# Patient Record
Sex: MALE | Race: WHITE | NOT HISPANIC OR LATINO | Employment: FULL TIME | URBAN - METROPOLITAN AREA
[De-identification: names, ages, dates, MRNs, and addresses within clinical notes are randomized per-mention and may not be internally consistent; named-entity substitution may affect disease eponyms.]

---

## 2017-12-05 ENCOUNTER — HOSPITAL ENCOUNTER (EMERGENCY)
Facility: CLINIC | Age: 27
Discharge: HOME | End: 2017-12-05
Attending: EMERGENCY MEDICINE | Admitting: EMERGENCY MEDICINE
Payer: COMMERCIAL

## 2017-12-05 VITALS
RESPIRATION RATE: 16 BRPM | SYSTOLIC BLOOD PRESSURE: 168 MMHG | DIASTOLIC BLOOD PRESSURE: 108 MMHG | TEMPERATURE: 98.2 F | OXYGEN SATURATION: 98 %

## 2017-12-05 DIAGNOSIS — M54.41 CHRONIC RIGHT-SIDED LOW BACK PAIN WITH RIGHT-SIDED SCIATICA: ICD-10-CM

## 2017-12-05 DIAGNOSIS — G89.29 CHRONIC RIGHT-SIDED LOW BACK PAIN WITH RIGHT-SIDED SCIATICA: ICD-10-CM

## 2017-12-05 PROCEDURE — 99284 EMERGENCY DEPT VISIT MOD MDM: CPT | Mod: Z6 | Performed by: EMERGENCY MEDICINE

## 2017-12-05 PROCEDURE — 99282 EMERGENCY DEPT VISIT SF MDM: CPT | Performed by: EMERGENCY MEDICINE

## 2017-12-05 RX ORDER — NAPROXEN 500 MG/1
500 TABLET ORAL EVERY 12 HOURS PRN
COMMUNITY
Start: 2017-11-30 | End: 2017-12-30

## 2017-12-05 RX ORDER — METHYLPREDNISOLONE 4 MG
TABLET, DOSE PACK ORAL
Qty: 21 TABLET | Refills: 0 | Status: SHIPPED | OUTPATIENT
Start: 2017-12-05 | End: 2017-12-14

## 2017-12-05 RX ORDER — GABAPENTIN 300 MG/1
300 CAPSULE ORAL 3 TIMES DAILY
Qty: 30 CAPSULE | Refills: 0 | Status: SHIPPED | OUTPATIENT
Start: 2017-12-05 | End: 2017-12-14

## 2017-12-05 RX ORDER — CYCLOBENZAPRINE HCL 10 MG
10 TABLET ORAL 3 TIMES DAILY PRN
Qty: 20 TABLET | Refills: 0 | Status: SHIPPED | OUTPATIENT
Start: 2017-12-05 | End: 2017-12-11

## 2017-12-05 ASSESSMENT — ENCOUNTER SYMPTOMS
NUMBNESS: 0
DYSURIA: 0
NAUSEA: 0
FREQUENCY: 0
BACK PAIN: 1
ABDOMINAL PAIN: 0
WEAKNESS: 0
VOMITING: 0

## 2017-12-05 NOTE — ED AVS SNAPSHOT
[facility] Emergency Department    [address] [address]    Phone:  [phone]    Fax:  [phone]                                       Rafael Duran   MRN: [id number]    Department:  [facility] Emergency Department   Date of Visit:  12/5/2017           Patient Information     Date Of Birth          1990        Your diagnoses for this visit were:     Chronic right-sided low back pain with right-sided sciatica        You were seen by Raghu Mcclelland MD.      Follow-up Information     Please follow up.    Why:  In the next 5-7 days for reassessment      Discharge References/Attachments     BACK PAIN W/ SCIATICA (ENGLISH)      24 Hour Appointment Hotline       To make an appointment at any [place] clinic, call [phone] ([phone]). If you don't have a family doctor or clinic, we will help you find one. [place] clinics are conveniently located to serve the needs of you and your family.             Review of your medicines      START taking        Dose / Directions Last dose taken    cyclobenzaprine 10 MG tablet   Commonly known as:  FLEXERIL   Dose:  10 mg   Quantity:  20 tablet        Take 1 tablet (10 mg) by mouth 3 times daily as needed for muscle spasms   Refills:  0        gabapentin 300 MG capsule   Commonly known as:  NEURONTIN   Dose:  300 mg   Quantity:  30 capsule        Take 1 capsule (300 mg) by mouth 3 times daily   Refills:  0        methylPREDNISolone 4 MG tablet   Commonly known as:  MEDROL DOSEPAK   Quantity:  21 tablet        Take as directed   Refills:  0          Our records show that you are taking the medicines listed below. If these are incorrect, please call your family doctor or clinic.        Dose / Directions Last dose taken    albuterol 108 (90 BASE) MCG/ACT Inhaler   Commonly known as:  PROAIR HFA/PROVENTIL HFA/VENTOLIN HFA   Dose:  2 puff   Quantity:  1 Inhaler        Inhale 2 puffs into the lungs every 6 hours as needed for shortness  "of breath / dyspnea Need appt for further refills   Refills:  0        fluticasone 50 MCG/ACT spray   Commonly known as:  FLONASE   Dose:  1-2 spray   Quantity:  1 Package        Spray 1-2 sprays into both nostrils daily Needs follow up appt this month.   Refills:  0                Prescriptions were sent or printed at these locations (3 Prescriptions)                   Other Prescriptions                Printed at Department/Unit printer (3 of 3)         methylPREDNISolone (MEDROL DOSEPAK) 4 MG tablet               cyclobenzaprine (FLEXERIL) 10 MG tablet               gabapentin (NEURONTIN) 300 MG capsule                Orders Needing Specimen Collection     None      Pending Results     No orders found from 12/3/2017 to 12/6/2017.            Pending Culture Results     No orders found from 12/3/2017 to 12/6/2017.            Pending Results Instructions     If you had any lab results that were not finalized at the time of your Discharge, you can call the ED Lab Result RN at [phone]. You will be contacted by this team for any positive Lab results or changes in treatment. The nurses are available 7 days a week from 10A to 6:30P.  You can leave a message 24 hours per day and they will return your call.        Test Results From Your Hospital Stay               Thank you for choosing [place]       Thank you for choosing [place] for your care. Our goal is always to provide you with excellent care. Hearing back from our patients is one way we can continue to improve our services. Please take a few minutes to complete the written survey that you may receive in the mail after you visit with us. Thank you!        CSRhart Information     [email or web address] lets you send messages to your doctor, view your test results, renew your prescriptions, schedule appointments and more. To sign up, go to [email or web address] . Click on \"Log in\" on the left side of the screen, which will take you to the Welcome page. Then click on \"Sign " "up Now\" on the right side of the page.     You will be asked to enter the access code listed below, as well as some personal information. Please follow the directions to create your username and password.     Your access code is: [email or web address]  Expires: 3/5/2018  2:27 PM     Your access code will  in 90 days. If you need help or a new code, please call your [place] clinic or [phone].        Care EveryWhere ID     This is your Care EveryWhere ID. This could be used by other organizations to access your [place] medical records  [id number]        Equal Access to Services     MARKO VALERA : Shahid Omalley, mike jay, vira cisneros, angelo hercules . So [facility] [phone].    ATENCIÓN: Si habla español, tiene a xie disposición servicios gratuitos de asistencia lingüística. Llame al [phone].    We comply with applicable federal civil rights laws and [place] laws. We do not discriminate on the basis of race, color, national origin, age, disability, sex, sexual orientation, or gender identity.            After Visit Summary       This is your record. Keep this with you and show to your community pharmacist(s) and doctor(s) at your next visit.                  "

## 2017-12-05 NOTE — ED AVS SNAPSHOT
[facility] Emergency Department    [address] [address]    Phone:  [phone]    Fax:  [phone]                                       Rafael Duran   MRN: [id number]    Department:  [facility] Emergency Department   Date of Visit:  12/5/2017           After Visit Summary Signature Page     I have received my discharge instructions, and my questions have been answered. I have discussed any challenges I see with this plan with the nurse or doctor.    ..........................................................................................................................................  Patient/Patient Representative Signature      ..........................................................................................................................................  Patient Representative Print Name and Relationship to Patient    ..................................................               ................................................  Date                                            Time    ..........................................................................................................................................  Reviewed by Signature/Title    ...................................................              ..............................................  Date                                                            Time

## 2017-12-05 NOTE — ED PROVIDER NOTES
History     Chief Complaint   Patient presents with     Back Pain     Lower R back pain, radiating down to R foot. Pt seen in other ED last week for same complaint.      HPI  Rafael Duran is a 27 year old male who presents to the Emergency Department with concerns of right sided low back pain. The pain radiates down his right leg, along the anterior and lateral aspect of his leg. Patient has previously low back pain. There is no new new injury. He is treating his pain with naproxen. Patient denies urinary frequency, urgency and hematuria. Patient was seen in the [place] ED on 11/30/17, 5 days ago. He was given IV dilaudid in the ED and prescribed 15 tabs of 5 mg valium for home use and recommended to use naproxen.  Patient denies any saddle paresthesias.  He does not have any focal motor weakness or sensory changes.  He is able ambulate without assistance.  His back pain dates back a number of years.  He does not specific injury for his original back pain or new injury recently.  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  No skin rash or history of shingles    Problem List:    Patient Active Problem List    Diagnosis Date Noted     Bilateral low back pain without sciatica 01/27/2016     Priority: Medium     Nicotine abuse 02/25/2013     Priority: Medium     Intermittent asthma 02/01/2012     Priority: Medium     PFT 1/25/12 results:FVC=92%, FEV1=80%, FEV1/FVC=86%.  INTERPRETATION: Mild obstructive lung disease. Reversibility with bronchodilators is seen on testing today. Lung volumes are within normal limits. DLCO is increased. An increase in DLCO can be seen in the setting of erythrocytosis, left-to-right heart shunt, acute asthma, and occasionally in congestive heart failure.          CARDIOVASCULAR SCREENING; LDL GOAL LESS THAN 130 12/01/2011     Priority: Medium     Obesity 11/25/2011     Priority: Medium        Past Medical History:    Past Medical History:    Diagnosis Date     Asthma        Past Surgical History:    Past Surgical History:   Procedure Laterality Date     NONE         Family History:    Family History   Problem Relation Age of Onset     DIABETES No family hx of      Cancer - colorectal No family hx of      C.A.D. No family hx of      CEREBROVASCULAR DISEASE No family hx of      Hypertension Paternal Grandfather      Asthma Paternal Grandfather      Asthma Father      Asthma Paternal Grandmother      Respiratory Paternal Grandmother      SHARLENE     Asthma Paternal Uncle      Asthma Paternal Aunt        Social History:  Marital Status:  Single [1]  Social History   Substance Use Topics     Smoking status: Current Every Day Smoker     Years: 8.00     Types: Cigarettes     Smokeless tobacco: Never Used      Comment: 5-6 ciggarettes per day as of 7/16/2014      Alcohol use Yes      Comment: Rare- once a month         Medications:      methylPREDNISolone (MEDROL DOSEPAK) 4 MG tablet   cyclobenzaprine (FLEXERIL) 10 MG tablet   gabapentin (NEURONTIN) 300 MG capsule   naproxen (NAPROSYN) 500 MG tablet       Review of Systems   Gastrointestinal: Negative for abdominal pain, nausea and vomiting.   Genitourinary: Negative for dysuria, frequency and urgency.   Musculoskeletal: Positive for back pain (low back).        Radiating pain into right leg anterior and lateral aspects   Skin: Negative for rash.   Neurological: Negative for weakness and numbness.   All other systems reviewed and are negative.      Physical Exam   BP: (!) 168/108  Heart Rate: 103  Temp: 98.2  F (36.8  C)  Resp: 16  SpO2: 98 %      Physical Exam and alert cooperative male in mild-to-moderate distress.  Examination of his back shows no obvious swelling, bruising, abrasion, skin rash, or asymmetry.  No CVA tenderness.  No midline bony tenderness, crepitus or step-off.  Muscular spasm in the lumbar region.  Pelvic rocking is negative.  Hips are nontender bilaterally.  Straight leg raising was a  "difficult exam as the patient continued to assist in the wheezing of his legs.  On the right did have some radicular symptoms at 35 .  DTRs were slightly diminished on the knee compared to left but ankles were equal.  He had intact strength for dorsiflexion and plantarflexion of the foot.  Also dorsiflexion of the great toe.  There is no skin rash on the leg.    ED Course     ED Course     Procedures               Critical Care time:  none               Labs Ordered and Resulted from Time of ED Arrival Up to the Time of Departure from the ED - No data to display    No results found for this or any previous visit (from the past 24 hour(s)).    Medications - No data to display    2:16 PM Patient assessed.       Assessments & Plan (with Medical Decision Making)   Patient is a 27-year-old male with a long history of chronic back pain.  Exacerbation of his symptoms recently with no known trauma.  Patient states using [place] on the 30th and was given some IV Dilaudid and recommend use Naprosyn.  Review of care everywhere, he also received Valium which he failed to tell us.  He currently has no saddle paresthesias or loss of bowel or bladder.  He is able ambulate.  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  Is not been ill.  He's had no urinary symptoms.  Denies any history of kidney stone.  He's had no fever or chills.  Denies abdominal pain currently.  On presentation patient appeared in mild to moderate distress with movement.  He was mildly hypertensive and tachycardic.  Afebrile and not hypoxic.  On back exam he had no midline crepitus or bony step-off.  Does have muscular spasm in the lumbar region.  No skin rash that would suggest shingles.  Pelvic rocking and hip exam was unremarkable.  Straight leg raising was difficult exam but did have right-sided pain with 35  angulation.  Intact strength and sensation.  DTRs at the knee was slightly " diminished compared to left.  Gait was normal.  He has no loss of bowel or bladder saddle paresthesias so doubt cauda equina syndrome.  He's not had imaging of the lumbar region in our facility.  I did a handout on lumbar radiculopathy.  As he does not have concerning neurologic symptoms no imaging was done today.  He'll be given Medrol Dosepak for anti-inflammatory properties.  Flexeril for muscle relaxation.  We'll also give him Neurontin for nerve pain.  He understands these meds are constipating.  Reasons to return to emergency room discussed.  Advised to see his doctor in the next 5-7 days for reassessment.  I have reviewed the nursing notes.    I have reviewed the findings, diagnosis, plan and need for follow up with the patient.       Discharge Medication List as of 12/5/2017  2:27 PM      START taking these medications    Details   methylPREDNISolone (MEDROL DOSEPAK) 4 MG tablet Take as directed, Disp-21 tablet, R-0, Local Print      cyclobenzaprine (FLEXERIL) 10 MG tablet Take 1 tablet (10 mg) by mouth 3 times daily as needed for muscle spasms, Disp-20 tablet, R-0, Local Print      gabapentin (NEURONTIN) 300 MG capsule Take 1 capsule (300 mg) by mouth 3 times daily, Disp-30 capsule, R-0, Local Print             Final diagnoses:   Chronic right-sided low back pain with right-sided sciatica     This document serves as a record of the services and decisions personally performed and made by Raghu Mcclelland MD. It was created on his behalf by Tran Coffey, a trained medical scribe. The creation of this document is based the provider's statements to the medical scribe.  Tran Coffey 2:16 PM 12/5/2017    Provider:   The information in this document, created by the medical scribe for me, accurately reflects the services I personally performed and the decisions made by me. I have reviewed and approved this document for accuracy prior to leaving the patient care area.  Raghu Mcclelland MD 2:16 PM  12/5/2017 12/5/2017   [facility] EMERGENCY DEPARTMENT     Raghu Mcclelland MD  12/05/17 1163

## 2017-12-05 NOTE — LETTER
December 5, 2017      To Whom It May Concern:      Rafael Duran was seen in our Emergency Department today, 12/05/17.  I expect his condition to improve over the next 4-5 days.  He may return to work when improved.    Sincerely,        Raghususie Mcclelland MD

## 2017-12-05 NOTE — ED NOTES
Pt has HX of back pain, flared up on Tuesday was seen [place] and given valium and Naproxen, still having and pain wants oxycodone. No new injury

## 2017-12-12 ENCOUNTER — HOSPITAL ENCOUNTER (EMERGENCY)
Facility: CLINIC | Age: 27
Discharge: HOME | End: 2017-12-12
Attending: EMERGENCY MEDICINE | Admitting: EMERGENCY MEDICINE
Payer: COMMERCIAL

## 2017-12-12 VITALS
HEIGHT: 68 IN | SYSTOLIC BLOOD PRESSURE: 170 MMHG | RESPIRATION RATE: 18 BRPM | BODY MASS INDEX: 42.74 KG/M2 | TEMPERATURE: 98.4 F | DIASTOLIC BLOOD PRESSURE: 98 MMHG | WEIGHT: 282 LBS | OXYGEN SATURATION: 96 %

## 2017-12-12 DIAGNOSIS — M54.41 ACUTE RIGHT-SIDED LOW BACK PAIN WITH RIGHT-SIDED SCIATICA: ICD-10-CM

## 2017-12-12 PROCEDURE — 99283 EMERGENCY DEPT VISIT LOW MDM: CPT

## 2017-12-12 PROCEDURE — 99283 EMERGENCY DEPT VISIT LOW MDM: CPT | Mod: Z6 | Performed by: EMERGENCY MEDICINE

## 2017-12-12 RX ORDER — METHOCARBAMOL 750 MG/1
TABLET, FILM COATED ORAL
Qty: 40 TABLET | Refills: 0 | Status: SHIPPED | OUTPATIENT
Start: 2017-12-12 | End: 2018-01-16

## 2017-12-12 RX ORDER — TRAMADOL HYDROCHLORIDE 50 MG/1
50-100 TABLET ORAL EVERY 6 HOURS PRN
Qty: 15 TABLET | Refills: 0 | Status: SHIPPED | OUTPATIENT
Start: 2017-12-12 | End: 2018-01-16

## 2017-12-12 ASSESSMENT — PAIN DESCRIPTION - DESCRIPTORS
DESCRIPTORS: ACHING

## 2017-12-12 NOTE — ED AVS SNAPSHOT
[facility] Emergency Department    [address] [address]    Phone:  [phone]    Fax:  [phone]                                       Rafael Duran   MRN: [id number]    Department:  [facility] Emergency Department   Date of Visit:  12/12/2017           Patient Information     Date Of Birth          1990        Your diagnoses for this visit were:     Acute right-sided low back pain with right-sided sciatica        You were seen by Guanakito Velázquez MD.      Follow-up Information     Follow up with Seble Bettencourt PA-C In 2 days.    Specialty:  Physician Assistant    Why:  As scheduled    Contact information:    [address] [facility] [address]  [phone]        Discharge References/Attachments     DEGENERATIVE DISK DISEASE (ENGLISH)    BACK PAIN W/ SCIATICA (ENGLISH)    BACK PAIN, RELIEVING (ENGLISH)    BACK PAIN (ACUTE OR CHRONIC) (ENGLISH)    BACK EXERCISES, LUMBAR (ENGLISH)    BACK CARE TIPS (ENGLISH)      Future Appointments        Provider Department Dept Phone Center    12/14/2017 9:00 AM Seble Bettencourt PA-C [facility] [phone] [facility]      24 Hour Appointment Hotline       To make an appointment at any [facility], call [phone] ([phone]). If you don't have a family doctor or clinic, we will help you find one. [facility] are conveniently located to serve the needs of you and your family.          ED Discharge Orders     PHYSICAL THERAPY REFERRAL       *This therapy referral will be filtered to a centralized scheduling office at [facility] and the patient will receive a call to schedule an appointment at a [facility] [place] location most convenient for them. *     [facility] provides Physical Therapy evaluation and treatment and many specialty services across the [facility] system.  If requesting a specialty program, please choose from the list below.    If you have not  "heard from the scheduling office within 2 business days, please call [phone] for all locations, with the exception of [place], please call [phone].  Treatment: Evaluation & Treatment  Special Instructions/Modalities: As deemed appropriate  Special Programs: As deemed appropriate    Please be aware that coverage of these services is subject to the terms and limitations of your health insurance plan.  Call member services at your health plan with any benefit or coverage questions.      **Note to Provider:  If you are referring outside of [place] for the therapy appointment, please list the name of the location in the \"special instructions\" above, print the referral and give to the patient to schedule the appointment.                     Review of your medicines      START taking        Dose / Directions Last dose taken    methocarbamol 750 MG tablet   Commonly known as:  ROBAXIN   Quantity:  40 tablet        1 tab by mouth every 4 hrs. or 2 tabs every 8 hrs. as needed for muscle spasm.   Refills:  0        traMADol 50 MG tablet   Commonly known as:  ULTRAM   Dose:   mg   Quantity:  15 tablet        Take 1-2 tablets ( mg) by mouth every 6 hours as needed for pain   Refills:  0          Our records show that you are taking the medicines listed below. If these are incorrect, please call your family doctor or clinic.        Dose / Directions Last dose taken    gabapentin 300 MG capsule   Commonly known as:  NEURONTIN   Dose:  300 mg   Quantity:  30 capsule        Take 1 capsule (300 mg) by mouth 3 times daily   Refills:  0        methylPREDNISolone 4 MG tablet   Commonly known as:  MEDROL DOSEPAK   Quantity:  21 tablet        Take as directed   Refills:  0        naproxen 500 MG tablet   Commonly known as:  NAPROSYN   Dose:  500 mg        Take 500 mg by mouth every 12 hours as needed   Refills:  0          ASK your doctor about these medications        Dose / Directions Last dose taken    " "cyclobenzaprine 10 MG tablet   Commonly known as:  FLEXERIL   Dose:  10 mg   Quantity:  20 tablet   Ask about: Should I take this medication?        Take 1 tablet (10 mg) by mouth 3 times daily as needed for muscle spasms   Refills:  0                Prescriptions were sent or printed at these locations (2 Prescriptions)                   Other Prescriptions                Printed at Department/Unit printer (2 of 2)         traMADol (ULTRAM) 50 MG tablet               methocarbamol (ROBAXIN) 750 MG tablet                Orders Needing Specimen Collection     None      Pending Results     No orders found from 12/10/2017 to 12/13/2017.            Pending Culture Results     No orders found from 12/10/2017 to 12/13/2017.            Pending Results Instructions     If you had any lab results that were not finalized at the time of your Discharge, you can call the ED Lab Result RN at [phone]. You will be contacted by this team for any positive Lab results or changes in treatment. The nurses are available 7 days a week from 10A to 6:30P.  You can leave a message 24 hours per day and they will return your call.        Test Results From Your Hospital Stay               Thank you for choosing [facility]       Thank you for choosing [facility] for your care. Our goal is always to provide you with excellent care. Hearing back from our patients is one way we can continue to improve our services. Please take a few minutes to complete the written survey that you may receive in the mail after you visit with us. Thank you!        InteraXon Information     [employer] lets you send messages to your doctor, view your test results, renew your prescriptions, schedule appointments and more. To sign up, go to [email or web address] . Click on \"Log in\" on the left side of the screen, which will take you to the Welcome page. Then click on \"Sign up Now\" on the right side of the page.     You will be asked to enter the access code listed below, " as well as some personal information. Please follow the directions to create your username and password.     Your access code is: [email or web address]  Expires: 3/5/2018  2:27 PM     Your access code will  in 90 days. If you need help or a new code, please call your [place] clinic or [phone].        Care EveryWhere ID     This is your Care EveryWhere ID. This could be used by other organizations to access your [place] medical records  [id number]        Equal Access to Services     MARKO VALERA : Shahid Omalley, wawilliam lupatienceadaha, qaybhaylee kaalmayesenia cisneros, angelo hercules . So [facility] [phone].    ATENCIÓN: Si habla español, tiene a xie disposición servicios gratuitos de asistencia lingüística. Llame al [phone].    We comply with applicable federal civil rights laws and [place] laws. We do not discriminate on the basis of race, color, national origin, age, disability, sex, sexual orientation, or gender identity.            After Visit Summary       This is your record. Keep this with you and show to your community pharmacist(s) and doctor(s) at your next visit.

## 2017-12-12 NOTE — LETTER
December 12, 2017      To Whom It May Concern:      Rafael Duran was seen in our Emergency Department today, Tuesday 12/12/17.  Please excuse him from work as needed this week due to illness/injury.    Sincerely,        ROE Velázquez MD

## 2017-12-12 NOTE — ED AVS SNAPSHOT
[facility] Emergency Department    [address] [address]    Phone:  [phone]    Fax:  [phone]                                       Rafael Duran   MRN: [id number]    Department:  [facility] Emergency Department   Date of Visit:  12/12/2017           After Visit Summary Signature Page     I have received my discharge instructions, and my questions have been answered. I have discussed any challenges I see with this plan with the nurse or doctor.    ..........................................................................................................................................  Patient/Patient Representative Signature      ..........................................................................................................................................  Patient Representative Print Name and Relationship to Patient    ..................................................               ................................................  Date                                            Time    ..........................................................................................................................................  Reviewed by Signature/Title    ...................................................              ..............................................  Date                                                            Time

## 2017-12-13 NOTE — ED NOTES
No changes in PT condition from previous assessments. All needs are being met and all comfort measures are being addressed. Awaiting MD eval and orders.

## 2017-12-13 NOTE — ED NOTES
No changes in PT condition from previous assessments. All needs are being met and all comfort measures are being addressed. Awaiting MD dispo at this time. I will continue to assess and monitor PT.

## 2017-12-13 NOTE — ED NOTES
PT ambulated to room 9 and placed on gurney and on the monitor. PT states she has been having low back pain x 3 weeks. States she has been see here for this and has been being treated. States tonight he began having increased back pain and numbness to the left arm and right leg. PT is A&OX4, appears to be in mild discomfort. All needs are being assessed and will be met and all comfort measures are being addressed. Awaiting MD anne and orders.

## 2017-12-13 NOTE — ED PROVIDER NOTES
History     Chief Complaint   Patient presents with     Back Pain     Back pain / L hand numbness/ Rt foot numb/ Rt lower back swollen     HPI  Rafael Duran is a 27 year old male with history of chronic intermittent low back pain with right leg radiculopathy with 3 weeks of atraumatic right low back pain radiating to the right leg.  He now presents because of persistent pain refractory to Neurontin, Medrol Dosepak and Flexeril prescribed in the ED 1 week ago. Prior to this ED evaluation he was in our ED 3 days prior, and 12 days ago at [facility] ED.  He has failed to follow-up in a primary care clinic.  He reports he has an appointment in primary care clinic next week, but he is unsure of the location of the clinic.  He has missed work for the past 3 weeks and presents with paperwork for medical leave of absence and he would like me to fill this out for him.  Low back pain was insidious in onset, he awoke with low back pain 3 weeks ago, it is sharp, severe, constant, exacerbated by movement and change in position and radiates into the right lateral leg towards the knee, and sometimes into the foot.  He has no leg weakness or bowel or bladder incontinence.  No other acute complaints or concerns.    Problem List:    Patient Active Problem List    Diagnosis Date Noted     Bilateral low back pain without sciatica 01/27/2016     Priority: Medium     Nicotine abuse 02/25/2013     Priority: Medium     Intermittent asthma 02/01/2012     Priority: Medium     PFT 1/25/12 results:FVC=92%, FEV1=80%, FEV1/FVC=86%.  INTERPRETATION: Mild obstructive lung disease. Reversibility with bronchodilators is seen on testing today. Lung volumes are within normal limits. DLCO is increased. An increase in DLCO can be seen in the setting of erythrocytosis, left-to-right heart shunt, acute asthma, and occasionally in congestive heart failure.          CARDIOVASCULAR SCREENING; LDL GOAL LESS THAN 130 12/01/2011      "Priority: Medium     Obesity 11/25/2011     Priority: Medium        Past Medical History:    Past Medical History:   Diagnosis Date     Asthma        Past Surgical History:    Past Surgical History:   Procedure Laterality Date     NONE         Family History:    Family History   Problem Relation Age of Onset     DIABETES No family hx of      Cancer - colorectal No family hx of      C.A.D. No family hx of      CEREBROVASCULAR DISEASE No family hx of      Hypertension Paternal Grandfather      Asthma Paternal Grandfather      Asthma Father      Asthma Paternal Grandmother      Respiratory Paternal Grandmother      SHARLENE     Asthma Paternal Uncle      Asthma Paternal Aunt        Social History:  Marital Status:  Single [1]  Social History   Substance Use Topics     Smoking status: Current Every Day Smoker     Years: 8.00     Types: Cigarettes     Smokeless tobacco: Never Used      Comment: 5-6 ciggarettes per day as of 7/16/2014      Alcohol use Yes      Comment: Rare- once a month         Medications:      traMADol (ULTRAM) 50 MG tablet   methocarbamol (ROBAXIN) 750 MG tablet   methylPREDNISolone (MEDROL DOSEPAK) 4 MG tablet   gabapentin (NEURONTIN) 300 MG capsule   naproxen (NAPROSYN) 500 MG tablet         Review of Systems  As mentioned above in the history present illness.  All other systems were reviewed and are negative.    Physical Exam   BP: (!) 187/106  Heart Rate: 99  Temp: 97.6  F (36.4  C)  Resp: 18  Height: 172.7 cm (5' 8\")  Weight: 127.9 kg (282 lb)  SpO2: 100 %      Physical Exam   Constitutional: He is oriented to person, place, and time. He appears well-developed and well-nourished. No distress.   HENT:   Head: Normocephalic and atraumatic.   Eyes: Conjunctivae and EOM are normal. No scleral icterus.   Neck: Normal range of motion. Neck supple. No tracheal deviation present.   Cardiovascular: Normal rate, regular rhythm and normal heart sounds.  Exam reveals no gallop and no friction rub.    No murmur " heard.  Pulmonary/Chest: Effort normal and breath sounds normal. No respiratory distress. He has no wheezes. He has no rales.   Musculoskeletal: Normal range of motion. He exhibits tenderness ( right LBP). He exhibits no edema.        Lumbar back: He exhibits tenderness and spasm. He exhibits normal range of motion.        Back:    Neurological: He is alert and oriented to person, place, and time. He has normal strength and normal reflexes. No sensory deficit.   Skin: Skin is warm and dry. No rash noted. He is not diaphoretic. No erythema. No pallor.   Psychiatric: He has a normal mood and affect. His behavior is normal.   Nursing note and vitals reviewed.      ED Course     ED Course     Procedures               Labs Ordered and Resulted from Time of ED Arrival Up to the Time of Departure from the ED - No data to display    Assessments & Plan (with Medical Decision Making)   Acute on chronic low back pain with acute recurrence 3 weeks ago, atraumatic in nature, with no evidence of cauda equina syndrome.  He has been seen in our ED 3 times and the [facility] ED once in the past 2 weeks.  He has an appointment a primary care clinic in 2 days.  He presents today requesting paperwork completion for medical leave of absence from work, which he is missed for the past 3 weeks.  I will give him a standard ED work excuse note and have him present his leave of absence paperwork to his primary care provider for completion, as deemed appropriate.  He was instructed on supportive care, prescribed tramadol x 15 tablets to use for pain refractory to NSAID and Robaxin for spasm.  I made a physical therapy referral for him.    I have reviewed the nursing notes.    I have reviewed the findings, diagnosis, plan and need for follow up with the patient.    New Prescriptions    METHOCARBAMOL (ROBAXIN) 750 MG TABLET    1 tab by mouth every 4 hrs. or 2 tabs every 8 hrs. as needed for muscle spasm.    TRAMADOL (ULTRAM) 50 MG  TABLET    Take 1-2 tablets ( mg) by mouth every 6 hours as needed for pain       Final diagnoses:   Acute right-sided low back pain with right-sided sciatica       12/12/2017   [facility] EMERGENCY DEPARTMENT     Guanakito Velázquez MD  12/12/17 [id number]

## 2017-12-14 ENCOUNTER — OFFICE VISIT (OUTPATIENT)
Dept: FAMILY MEDICINE | Facility: CLINIC | Age: 27
End: 2017-12-14
Payer: COMMERCIAL

## 2017-12-14 VITALS
HEART RATE: 98 BPM | BODY MASS INDEX: 45.31 KG/M2 | DIASTOLIC BLOOD PRESSURE: 101 MMHG | SYSTOLIC BLOOD PRESSURE: 138 MMHG | HEIGHT: 68 IN | WEIGHT: 299 LBS | TEMPERATURE: 97.8 F

## 2017-12-14 DIAGNOSIS — M54.41 ACUTE RIGHT-SIDED LOW BACK PAIN WITH RIGHT-SIDED SCIATICA: Primary | ICD-10-CM

## 2017-12-14 DIAGNOSIS — E66.01 MORBID OBESITY (H): ICD-10-CM

## 2017-12-14 DIAGNOSIS — R03.0 ELEVATED BLOOD PRESSURE READING WITHOUT DIAGNOSIS OF HYPERTENSION: ICD-10-CM

## 2017-12-14 PROCEDURE — 99214 OFFICE O/P EST MOD 30 MIN: CPT | Performed by: PHYSICIAN ASSISTANT

## 2017-12-14 RX ORDER — IBUPROFEN 800 MG/1
800 TABLET, FILM COATED ORAL EVERY 6 HOURS PRN
Qty: 28 TABLET | Refills: 0 | Status: SHIPPED | OUTPATIENT
Start: 2017-12-14 | End: 2020-02-04

## 2017-12-14 RX ORDER — GABAPENTIN 300 MG/1
600 CAPSULE ORAL 3 TIMES DAILY
Qty: 42 CAPSULE | Refills: 0 | Status: SHIPPED | OUTPATIENT
Start: 2017-12-14 | End: 2018-01-16

## 2017-12-14 ASSESSMENT — PAIN SCALES - GENERAL: PAINLEVEL: EXTREME PAIN (8)

## 2017-12-14 NOTE — NURSING NOTE
"Chief Complaint   Patient presents with     ER F/U       Initial BP (!) 138/101 (BP Location: Right arm, Patient Position: Chair, Cuff Size: Adult Large)  Pulse 98  Temp 97.8  F (36.6  C) (Tympanic)  Ht 5' 8\" (1.727 m)  Wt 299 lb (135.6 kg)  BMI 45.46 kg/m2 Estimated body mass index is 45.46 kg/(m^2) as calculated from the following:    Height as of this encounter: 5' 8\" (1.727 m).    Weight as of this encounter: 299 lb (135.6 kg).  Medication Reconciliation: complete    Health Maintenance that is potentially due pending provider review:  NONE        Is there anyone who you would like to be able to receive your results? No  If yes have patient fill out YI      "

## 2017-12-14 NOTE — LETTER
[facility]  [address] [place] [address]  Phone: [phone]  Fax: [phone]    December 14, 2017        Rafael Duran  [address]  [place] [address]          To whom it may concern:    RE: Rafael Duran    Patient was seen and treated today at our clinic and missed work since 11/30/17.  We are making medication changes and starting physical therapy.  At this time we anticipate that he can attempt to work 2-hour shifts.  This would be limited to seated work without requiring walking, any lifting, or any pivot or reach beyond what he can reach without repositioning or leaning his body.  If this cannot be accommodated, he will be off work.  He will be re-evaluated in 1 week's time.    Please contact me for questions or concerns.      Sincerely,        Seble Bettencourt PA-C

## 2017-12-14 NOTE — PATIENT INSTRUCTIONS
Work note   Set up Physical Therapy ASAP  Discussed long use of controlled substances is risky because they are addicting  Sounds like they're not helping much anyway  Can try increased dose of gabapentin  Can try prescription ibuprofen.  Do not overlap with naproxen.  Ok to overlap with tylenol (acetaminophen), max 1000 mg (3 tabs normal strength or 2 extra strength), can repeat in 4 hrs but max 4 doses within a day    Recheck in 1 week    Call if questions    Discussed need to recheck BP.  Normal would be under 130/80.  Higher risks long term health problems.

## 2017-12-14 NOTE — PROGRESS NOTES
SUBJECTIVE:   Rafael Duran is a 27 year old male who presents to clinic today for the following health issues:      ED/UC Followup:    Facility:  [facility]  Date of visit: 11/30/2017 ([place]), 12/5/17 and 12/12/17  Reason for visit: Acute right sided low back pain  Current Status: Has not had any relief or improvement.  Has not set up Physical therapy yet   History of occasional back issues in past.  This time has lasted longer - now 3 wks.  First week was worst, has improved some but been about same x 2 wks.  Worst - sitting, walking, getting up.  Laying down seems to help but this does awaken him at night.  Is R sided predominantly buttock when sitting, but towards spine when standing.  Radiates down front of thigh and then is front and back of lower leg, but no particular toes affected.  No weakness, saddle paresthesia, b/b incontinence, or fever.    Feels none of meds have worked very well.  Tramadol helps a little.  Has been on percocet, valium, medrol dose pack.  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  Last exacerbation 2015.    BP - very high x 3 visits, but he states not typically high.  Morbid obesity.    Problem list and histories reviewed & adjusted, as indicated.  Additional history: as documented    BP Readings from Last 3 Encounters:   12/14/17 (!) 138/101   12/12/17 (!) 170/98   12/05/17 (!) 168/108    Wt Readings from Last 3 Encounters:   12/14/17 299 lb (135.6 kg)   12/12/17 282 lb (127.9 kg)   01/27/16 288 lb (130.6 kg)         Labs reviewed in EPIC    Reviewed and updated as needed this visit by clinical staff  Tobacco  Allergies  Meds  Problems  Med Hx  Surg Hx  Fam Hx  Soc Hx        Reviewed and updated as needed this visit by Provider  Tobacco  Allergies  Meds  Problems  Med Hx  Surg Hx  Fam Hx  Soc Hx          ROS:  Constitutional, GI, , musculoskeletal, neuro, systems are negative, except as otherwise noted.      OBJECTIVE:   BP (!) 138/101  "(BP Location: Right arm, Patient Position: Chair, Cuff Size: Adult Large)  Pulse 98  Temp 97.8  F (36.6  C) (Tympanic)  Ht 5' 8\" (1.727 m)  Wt 299 lb (135.6 kg)  BMI 45.46 kg/m2  Body mass index is 45.46 kg/(m^2).  GENERAL: healthy, alert and no distress  PSYCH: mentation appears normal, affect normal/bright  Back: Lumbar back without usual curvature.  Spine not tender to palpation.  Markedly decreased flexion, extension, and side bending and twisting to R are with pain, to L are normal.  No tenderness or spasm.  Pain is mostly in buttock.  NEURO: Gait is somewhat guarded and favoring R. Patellar reflex absent on affected R side. No clonus.  Able to raise on toes.  Hip adduction and abduction, and hip, knee, are greatly impaired as even mild ROM causes pain.       reviewed, no major findings    ASSESSMENT/PLAN:       ICD-10-CM    1. Acute right-sided low back pain with right-sided sciatica M54.41 ibuprofen (ADVIL/MOTRIN) 800 MG tablet     gabapentin (NEURONTIN) 300 MG capsule   2. Elevated blood pressure reading without diagnosis of hypertension R03.0    3. Morbid obesity (H) E66.01      Symptoms x 3 wks and with recent use of controlled substances, no clear benefit on pain, and while patient describes pain as severe, he has not been quick to set up f/u appointments as suggested by ED  He does not think he can do anything at work.  Also thinks employer could not find other tasks.    FMLA completed.  Patient Instructions   Work note   Set up Physical Therapy ASAP  Discussed long use of controlled substances is risky because they are addicting  Sounds like they're not helping much anyway  Can try increased dose of gabapentin  Can try prescription ibuprofen.  Do not overlap with naproxen.  Ok to overlap with tylenol (acetaminophen), max 1000 mg (3 tabs normal strength or 2 extra strength), can repeat in 4 hrs but max 4 doses within a day    Recheck in 1 week    Call if questions    Discussed need to recheck BP. "  Normal would be under 130/80.  Higher risks long term health problems.         Seble Bettencourt PA-C  [facility]

## 2017-12-14 NOTE — MR AVS SNAPSHOT
After Visit Summary   12/14/2017    Rafael Duran    MRN: [id number]           Patient Information     Date Of Birth          1990        Visit Information        Provider Department      12/14/2017 9:00 AM Seble Bettencourt PA-C [facility]        Today's Diagnoses     Acute right-sided low back pain with right-sided sciatica    -  1      Care Instructions    Work note   Set up Physical Therapy ASAP  Discussed long use of controlled substances is risky because they are addicting  Sounds like they're not helping much anyway  Can try increased dose of gabapentin  Can try prescription ibuprofen.  Do not overlap with naproxen.  Ok to overlap with tylenol (acetaminophen), max 1000 mg (3 tabs normal strength or 2 extra strength), can repeat in 4 hrs but max 4 doses within a day    Recheck in 1 week    Call if questions    Discussed need to recheck BP.  Normal would be under 130/80.  Higher risks long term health problems.             Follow-ups after your visit        Who to contact     If you have questions or need follow up information about today's clinic visit or your schedule please contact [facility] directly at [phone].  Normal or non-critical lab and imaging results will be communicated to you by [employer], letter or phone within 4 business days after the clinic has received the results. If you do not hear from us within 7 days, please contact the clinic through [employer] or phone. If you have a critical or abnormal lab result, we will notify you by phone as soon as possible.  Submit refill requests through [employer] or call your pharmacy and they will forward the refill request to us. Please allow 3 business days for your refill to be completed.          Additional Information About Your Visit        [employer] Information     [employer] lets you send messages to your doctor, view your test results, renew your prescriptions, schedule appointments and more.  "To sign up, go to [email or web address] . Click on \"Log in\" on the left side of the screen, which will take you to the Welcome page. Then click on \"Sign up Now\" on the right side of the page.     You will be asked to enter the access code listed below, as well as some personal information. Please follow the directions to create your username and password.     Your access code is: [email or web address]  Expires: 3/5/2018  2:27 PM     Your access code will  in 90 days. If you need help or a new code, please call your [facility] clinic or [phone].        Care EveryWhere ID     This is your Care EveryWhere ID. This could be used by other organizations to access your [facility] medical records  [id number]        Your Vitals Were     Pulse Temperature Height BMI (Body Mass Index)          98 97.8  F (36.6  C) (Tympanic) 5' 8\" (1.727 m) 45.46 kg/m2         Blood Pressure from Last 3 Encounters:   17 (!) 138/101   17 (!) 170/98   17 (!) 168/108    Weight from Last 3 Encounters:   17 299 lb (135.6 kg)   17 282 lb (127.9 kg)   16 288 lb (130.6 kg)              Today, you had the following     No orders found for display         Today's Medication Changes          These changes are accurate as of: 17 10:07 AM.  If you have any questions, ask your nurse or doctor.               Start taking these medicines.        Dose/Directions    ibuprofen 800 MG tablet   Commonly known as:  ADVIL/MOTRIN   Used for:  Acute right-sided low back pain with right-sided sciatica   Started by:  Seble Bettencourt PA-C        Dose:  800 mg   Take 1 tablet (800 mg) by mouth every 6 hours as needed for moderate pain   Quantity:  28 tablet   Refills:  0         These medicines have changed or have updated prescriptions.        Dose/Directions    gabapentin 300 MG capsule   Commonly known as:  NEURONTIN   This may have changed:  how much to take   Used for:  Acute right-sided low back pain with " right-sided sciatica   Changed by:  Seble Bettencourt PA-C        Dose:  600 mg   Take 2 capsules (600 mg) by mouth 3 times daily   Quantity:  42 capsule   Refills:  0         Stop taking these medicines if you haven't already. Please contact your care team if you have questions.     methylPREDNISolone 4 MG tablet   Commonly known as:  MEDROL DOSEPAK   Stopped by:  Seble Bettencourt PA-C                Where to get your medicines      These medications were sent to [facility] [address] [address]     Phone:  [phone]     gabapentin 300 MG capsule         Some of these will need a paper prescription and others can be bought over the counter.  Ask your nurse if you have questions.     Bring a paper prescription for each of these medications     ibuprofen 800 MG tablet                Primary Care Provider Office Phone # Fax #    [facility] [phone] [address] [facility] [address]        Equal Access to Services     MARKO AVLERA AH: Hadii ignacio ku hadasho Soomaali, waaxda luqadaha, qaybta kaalmada adeegyada, waxay idiin hayteresa hercules . So [facility] [phone].    ATENCIÓN: Si habla español, tiene a xie disposición servicios gratuitos de asistencia lingüística. Llame al [phone].    We comply with applicable federal civil rights laws and [place] laws. We do not discriminate on the basis of race, color, national origin, age, disability, sex, sexual orientation, or gender identity.            Thank you!     Thank you for choosing [facility]  for your care. Our goal is always to provide you with excellent care. Hearing back from our patients is one way we can continue to improve our services. Please take a few minutes to complete the written survey that you may receive in the mail after your visit with us. Thank you!             Your Updated Medication  List - Protect others around you: Learn how to safely use, store and throw away your medicines at www.disposemymeds.org.          This list is accurate as of: 12/14/17 10:07 AM.  Always use your most recent med list.                   Brand Name Dispense Instructions for use Diagnosis    gabapentin 300 MG capsule    NEURONTIN    42 capsule    Take 2 capsules (600 mg) by mouth 3 times daily    Acute right-sided low back pain with right-sided sciatica       ibuprofen 800 MG tablet    ADVIL/MOTRIN    28 tablet    Take 1 tablet (800 mg) by mouth every 6 hours as needed for moderate pain    Acute right-sided low back pain with right-sided sciatica       methocarbamol 750 MG tablet    ROBAXIN    40 tablet    1 tab by mouth every 4 hrs. or 2 tabs every 8 hrs. as needed for muscle spasm.        naproxen 500 MG tablet    NAPROSYN     Take 500 mg by mouth every 12 hours as needed        traMADol 50 MG tablet    ULTRAM    15 tablet    Take 1-2 tablets ( mg) by mouth every 6 hours as needed for pain

## 2017-12-15 ASSESSMENT — ASTHMA QUESTIONNAIRES: ACT_TOTALSCORE: 22

## 2017-12-21 ENCOUNTER — HOSPITAL ENCOUNTER (OUTPATIENT)
Dept: PHYSICAL THERAPY | Facility: CLINIC | Age: 27
Setting detail: THERAPIES SERIES
End: 2017-12-21
Attending: EMERGENCY MEDICINE
Payer: COMMERCIAL

## 2017-12-21 DIAGNOSIS — M54.41 ACUTE RIGHT-SIDED LOW BACK PAIN WITH RIGHT-SIDED SCIATICA: ICD-10-CM

## 2017-12-21 PROCEDURE — 97110 THERAPEUTIC EXERCISES: CPT | Mod: GP | Performed by: PHYSICAL THERAPIST

## 2017-12-21 PROCEDURE — 97161 PT EVAL LOW COMPLEX 20 MIN: CPT | Mod: GP | Performed by: PHYSICAL THERAPIST

## 2017-12-21 PROCEDURE — 97140 MANUAL THERAPY 1/> REGIONS: CPT | Mod: GP | Performed by: PHYSICAL THERAPIST

## 2017-12-21 PROCEDURE — 40000718 ZZHC STATISTIC PT DEPARTMENT ORTHO VISIT: Performed by: PHYSICAL THERAPIST

## 2017-12-21 NOTE — PROGRESS NOTES
12/21/17 1000   General Information   Type of Visit Initial OP Ortho PT Evaluation   Start of Care Date 12/21/17   Referring Physician Guanakito Velázquez MD    Patient/Family Goals Statement reduce pain and return to work    Orders Evaluate and Treat   Date of Order 12/12/17   Insurance Type Other   Insurance Comments/Visits Authorized Blue Plus - 20    Medical Diagnosis Acute right-sided low back pain with right-sided sciatica M54.41   Surgical/Medical history reviewed Yes   Precautions/Limitations no known precautions/limitations   Body Part(s)   Body Part(s) Lumbar Spine/SI   Presentation and Etiology   Pertinent history of current problem (include personal factors and/or comorbidities that impact the POC) Pt relates he woke up with LBP. Has has on/off symptoms since he was 18.  Pt relates he is having pain down to the R ankle this time and this is the worst flare-up he has had. Pt relates he has not tried any other tx. Pt is currently off work for 2 more weeks. Pt relates sleeping is awful.  Denies  changes in bowel/bladder function.  PMH: asthma, smoking   Impairments A. Pain;C. Swelling;E. Decreased flexibility;F. Decreased strength and endurance;H. Impaired gait;K. Numbness;L. Tingling   Functional Limitations perform activities of daily living;perform required work activities;perform desired leisure / sports activities   Symptom Location Low back   How/Where did it occur At home   Onset date of current episode/exacerbation 11/28/17   Chronicity Recurrent   Pain rating (0-10 point scale) Best (/10);Worst (/10)   Best (/10) 4   Worst (/10) 8   Pain quality A. Sharp;B. Dull;C. Aching   Frequency of pain/symptoms A. Constant   Pain/symptoms exacerbated by A. Sitting;B. Walking;C. Lifting;D. Carrying;G. Certain positions;H. Overhead reach;I. Bending   Pain/symptoms eased by F. Certain positions;H. Cold   Progression of symptoms since onset: Improved   Current Level of Function   Current Community Support  Family/friend caregiver   Patient role/employment history A. 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 No   Have you fallen and had an injury in the past year? No   Is patient a fall risk? No   System Outcome Measures   Outcome Measures Low Back Pain (see Oswestry and Debbi)   Lumbar Spine/SI Objective Findings   Integumentary L lateral shift    Gait/Locomotion walking w limp and L lateral shift, L ankle appears to give out, pt relates no pain on L    Balance/Proprioception (Single Leg Stance) R: 5 secs w mod swya and pain L: 5 secs w min sqay    Flexion ROM 6 in above knees w pain   Extension ROM 20 deg w pain   Right Side Bending ROM to knee w pain    Left Side Bending ROM to knee w pain   Pelvic Screen L posterior innominate rotation    Hip Flexion (L2) Strength R: 4+/5 L: 5/5   Hip Abduction Strength R:2/5 L:3/5   Knee Flexion Strength R: 4+/5 L: 5/5   Knee Extension (L3) Strength R: 4+/5 L: 5/5   Ankle Dorsiflexion (L4) Strength heel walking: difficult to lift R toes up, R:4/5  L: 5/5   Ankle Plantar Flexion (S1) Strength able to walk on toes   SLR R:20 L:30   Slump Test R: + on R L:- on L    Patellar Tendon Reflexes  R: absent w/o jendrassik L: normal    Achilles Tendon Reflexes normal B    Palpation TTP BI SI- R worse than L, TTP R pirofmris    Planned Therapy Interventions   Planned Therapy Interventions balance training;gait training;joint mobilization;manual therapy;neuromuscular re-education;ROM;strengthening;stretching   Planned Modality Interventions   Planned Modality Interventions Cryotherapy;Electrical stimulation;Hot packs;TENS;Traction   Clinical Impression   Criteria for Skilled Therapeutic Interventions Met yes, treatment indicated   PT Diagnosis R SI joint dysfunction w R radicular  symptoms    Influenced by the following impairments limited ROM, pain and weakness   Functional limitations due to impairments walking, standign, working, lifting   Clinical Presentation Evolving/Changing   Clinical Presentation Rationale Pt is pleasant 27 yr old man who presetns with R SI dycftunciton w radicualr symptosm. Concerning factors include decreased reflexes and weakness on R however pt has min PMH   Clinical Decision Making (Complexity) Low complexity   Therapy Frequency 2 times/Week   Predicted Duration of Therapy Intervention (days/wks) 6 weeks   Risk & Benefits of therapy have been explained Yes   Patient, Family & other staff in agreement with plan of care Yes   Education Assessment   Preferred Learning Style Listening;Reading;Demonstration;Pictures/video   Barriers to Learning No barriers   ORTHO GOALS   PT Ortho Eval Goals 1;2;3;4   Ortho Goal 1   Goal Identifier lumbar ROM   Goal Description Pt will be able to demonstrate full lumbar ROM in order to be able to  object off of the ground without pain or radicular symptoms.   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    Target Date 02/01/18   Total Evaluation Time   Total Evaluation Time 50 (20 eval, 15 MT, 15 TE)      Magdalean Guerrero  Physical Therapist  [address] [address]  [email or web address]   [email or web address]    Office: [phone] Fax: [phone]

## 2017-12-28 ENCOUNTER — HOSPITAL ENCOUNTER (OUTPATIENT)
Dept: PHYSICAL THERAPY | Facility: CLINIC | Age: 27
Setting detail: THERAPIES SERIES
End: 2017-12-28
Attending: EMERGENCY MEDICINE
Payer: COMMERCIAL

## 2017-12-28 PROCEDURE — 40000718 ZZHC STATISTIC PT DEPARTMENT ORTHO VISIT: Performed by: PHYSICAL THERAPIST

## 2017-12-28 PROCEDURE — 97110 THERAPEUTIC EXERCISES: CPT | Mod: GP | Performed by: PHYSICAL THERAPIST

## 2017-12-28 PROCEDURE — 97140 MANUAL THERAPY 1/> REGIONS: CPT | Mod: GP | Performed by: PHYSICAL THERAPIST

## 2018-01-03 ENCOUNTER — HOSPITAL ENCOUNTER (OUTPATIENT)
Dept: PHYSICAL THERAPY | Facility: CLINIC | Age: 28
Setting detail: THERAPIES SERIES
End: 2018-01-03
Attending: EMERGENCY MEDICINE
Payer: COMMERCIAL

## 2018-01-03 PROCEDURE — 97110 THERAPEUTIC EXERCISES: CPT | Mod: GP | Performed by: PHYSICAL THERAPIST

## 2018-01-03 PROCEDURE — 40000718 ZZHC STATISTIC PT DEPARTMENT ORTHO VISIT: Performed by: PHYSICAL THERAPIST

## 2018-01-03 PROCEDURE — 97140 MANUAL THERAPY 1/> REGIONS: CPT | Mod: GP | Performed by: PHYSICAL THERAPIST

## 2018-01-05 ENCOUNTER — HOSPITAL ENCOUNTER (OUTPATIENT)
Dept: PHYSICAL THERAPY | Facility: CLINIC | Age: 28
Setting detail: THERAPIES SERIES
End: 2018-01-05
Attending: EMERGENCY MEDICINE
Payer: COMMERCIAL

## 2018-01-05 PROCEDURE — 97140 MANUAL THERAPY 1/> REGIONS: CPT | Mod: GP | Performed by: PHYSICAL THERAPIST

## 2018-01-05 PROCEDURE — 40000718 ZZHC STATISTIC PT DEPARTMENT ORTHO VISIT: Performed by: PHYSICAL THERAPIST

## 2018-01-05 PROCEDURE — 97110 THERAPEUTIC EXERCISES: CPT | Mod: GP | Performed by: PHYSICAL THERAPIST

## 2018-01-11 ENCOUNTER — HOSPITAL ENCOUNTER (OUTPATIENT)
Dept: PHYSICAL THERAPY | Facility: CLINIC | Age: 28
Setting detail: THERAPIES SERIES
End: 2018-01-11
Attending: EMERGENCY MEDICINE
Payer: COMMERCIAL

## 2018-01-11 PROCEDURE — 40000718 ZZHC STATISTIC PT DEPARTMENT ORTHO VISIT: Performed by: PHYSICAL THERAPIST

## 2018-01-11 PROCEDURE — 97110 THERAPEUTIC EXERCISES: CPT | Mod: GP | Performed by: PHYSICAL THERAPIST

## 2018-01-16 ENCOUNTER — OFFICE VISIT (OUTPATIENT)
Dept: FAMILY MEDICINE | Facility: CLINIC | Age: 28
End: 2018-01-16
Payer: COMMERCIAL

## 2018-01-16 VITALS
BODY MASS INDEX: 46.23 KG/M2 | SYSTOLIC BLOOD PRESSURE: 144 MMHG | DIASTOLIC BLOOD PRESSURE: 88 MMHG | HEART RATE: 92 BPM | HEIGHT: 68 IN | TEMPERATURE: 98.5 F | WEIGHT: 305 LBS

## 2018-01-16 DIAGNOSIS — M54.50 CHRONIC BILATERAL LOW BACK PAIN WITHOUT SCIATICA: Primary | ICD-10-CM

## 2018-01-16 DIAGNOSIS — G89.29 CHRONIC BILATERAL LOW BACK PAIN WITHOUT SCIATICA: Primary | ICD-10-CM

## 2018-01-16 PROCEDURE — 99213 OFFICE O/P EST LOW 20 MIN: CPT | Performed by: PHYSICIAN ASSISTANT

## 2018-01-16 ASSESSMENT — ENCOUNTER SYMPTOMS
EYE PAIN: 0
DEPRESSION: 0
SENSORY CHANGE: 0
PALPITATIONS: 0
SORE THROAT: 0
NAUSEA: 0
HEMOPTYSIS: 0
NERVOUS/ANXIOUS: 0
WEIGHT LOSS: 0
CONSTIPATION: 0
INSOMNIA: 0
FEVER: 0
BLURRED VISION: 0
NEUROLOGICAL NEGATIVE: 1
FREQUENCY: 0
LOSS OF CONSCIOUSNESS: 0
SHORTNESS OF BREATH: 0
MYALGIAS: 1
SPUTUM PRODUCTION: 0
DIARRHEA: 0
HEADACHES: 0
PHOTOPHOBIA: 0
DOUBLE VISION: 0
TINGLING: 0
HEARTBURN: 0
WHEEZING: 0
FOCAL WEAKNESS: 0
COUGH: 0
SEIZURES: 0
ABDOMINAL PAIN: 0
DIZZINESS: 0
ORTHOPNEA: 0
EYE REDNESS: 0
DYSURIA: 0
BACK PAIN: 1
DIAPHORESIS: 0
EYE DISCHARGE: 0
BLOOD IN STOOL: 0
HALLUCINATIONS: 0
VOMITING: 0
WEAKNESS: 0
NECK PAIN: 0

## 2018-01-16 ASSESSMENT — LIFESTYLE VARIABLES: SUBSTANCE_ABUSE: 0

## 2018-01-16 NOTE — LETTER
My Asthma Action Plan  Name: Rafael Duran   YOB: 1990  Date: 1/16/2018   My doctor: Gianni Mccann PA-C   My clinic: [facility]        My Control Medicine: None  My Rescue Medicine: None   My Asthma Severity: intermittent  Avoid your asthma triggers: Patient is unaware of triggers               GREEN ZONE   Good Control    I feel good    No cough or wheeze    Can work, sleep and play without asthma symptoms       Take your asthma control medicine every day.     1. If exercise triggers your asthma, take your rescue medication    15 minutes before exercise or sports, and    During exercise if you have asthma symptoms  2. Spacer to use with inhaler: If you have a spacer, make sure to use it with your inhaler             YELLOW ZONE Getting Worse  I have ANY of these:    I do not feel good    Cough or wheeze    Chest feels tight    Wake up at night   1. Keep taking your Green Zone medications  2. Start taking your rescue medicine:    every 20 minutes for up to 1 hour. Then every 4 hours for 24-48 hours.  3. If you stay in the Yellow Zone for more than 12-24 hours, contact your doctor.  4. If you do not return to the Green Zone in 12-24 hours or you get worse, start taking your oral steroid medicine if prescribed by your provider.           RED ZONE Medical Alert - Get Help  I have ANY of these:    I feel awful    Medicine is not helping    Breathing getting harder    Trouble walking or talking    Nose opens wide to breathe       1. Take your rescue medicine NOW  2. If your provider has prescribed an oral steroid medicine, start taking it NOW  3. Call your doctor NOW  4. If you are still in the Red Zone after 20 minutes and you have not reached your doctor:    Take your rescue medicine again and    Call 911 or go to the emergency room right away    See your regular doctor within 2 weeks of an Emergency Room or Urgent Care visit for follow-up treatment.        Electronically signed  by: Nicole Caro, January 16, 2018    Annual Reminders:  Meet with Asthma Educator,  Flu Shot in the Fall, consider Pneumonia Vaccination for patients with asthma (aged 19 and older).    Pharmacy:    [facility] - [place], [place] - [address] [facility] - [place] - [address] [facility]. [facility] [place] [address] [facility]                    Asthma Triggers  How To Control Things That Make Your Asthma Worse    Triggers are things that make your asthma worse.  Look at the list below to help you find your triggers and what you can do about them.  You can help prevent asthma flare-ups by staying away from your triggers.      Trigger                                                          What you can do   Cigarette Smoke  Tobacco smoke can make asthma worse. Do not allow smoking in your home, car or around you.  Be sure no one smokes at a child s day care or school.  If you smoke, ask your health care provider for ways to help you quit.  Ask family members to quit too.  Ask your health care provider for a referral to Quit Plan to help you quit smoking, or call 0-737-125-PLAN.     Colds, Flu, Bronchitis  These are common triggers of asthma. Wash your hands often.  Don t touch your eyes, nose or mouth.  Get a flu shot every year.     Dust Mites  These are tiny bugs that live in cloth or carpet. They are too small to see. Wash sheets and blankets in hot water every week.   Encase pillows and mattress in dust mite proof covers.  Avoid having carpet if you can. If you have carpet, vacuum weekly.   Use a dust mask and HEPA vacuum.   Pollen and Outdoor Mold  Some people are allergic to trees, grass, or weed pollen, or molds. Try to keep your windows closed.  Limit time out doors when pollen count is high.   Ask you health care provider about taking medicine during allergy season.     Animal Dander  Some people are allergic to skin flakes, urine or saliva from pets with  fur or feathers. Keep pets with fur or feathers out of your home.    If you can t keep the pet outdoors, then keep the pet out of your bedroom.  Keep the bedroom door closed.  Keep pets off cloth furniture and away from stuffed toys.     Mice, Rats, and Cockroaches  Some people are allergic to the waste from these pests.   Cover food and garbage.  Clean up spills and food crumbs.  Store grease in the refrigerator.   Keep food out of the bedroom.   Indoor Mold  This can be a trigger if your home has high moisture. Fix leaking faucets, pipes, or other sources of water.   Clean moldy surfaces.  Dehumidify basement if it is damp and smelly.   Smoke, Strong Odors, and Sprays  These can reduce air quality. Stay away from strong odors and sprays, such as perfume, powder, hair spray, paints, smoke incense, paint, cleaning products, candles and new carpet.   Exercise or Sports  Some people with asthma have this trigger. Be active!  Ask your doctor about taking medicine before sports or exercise to prevent symptoms.    Warm up for 5-10 minutes before and after sports or exercise.     Other Triggers of Asthma  Cold air:  Cover your nose and mouth with a scarf.  Sometimes laughing or crying can be a trigger.  Some medicines and food can trigger asthma.

## 2018-01-16 NOTE — LETTER
[facility]  [address] [place] [address]  Phone: [phone]  Fax: [phone]    January 16, 2018        Rafael Duran  [address]  [facility] [address]          To whom it may concern:    RE: Rafael Duran    Patient was seen and treated today at our clinic. He may return to work 1/17/18.    Please contact me for questions or concerns.      Sincerely,        Gianni Mccann PA-C

## 2018-01-16 NOTE — MR AVS SNAPSHOT
"              After Visit Summary   2018    Rafael Duran    MRN: [id number]           Patient Information     Date Of Birth          1990        Visit Information        Provider Department      2018 1:00 PM Gianni Mccann PA-C [facility]        Today's Diagnoses     Chronic bilateral low back pain without sciatica    -  1       Follow-ups after your visit        Follow-up notes from your care team     Return if symptoms worsen or fail to improve.      Who to contact     If you have questions or need follow up information about today's clinic visit or your schedule please contact [facility] directly at [phone].  Normal or non-critical lab and imaging results will be communicated to you by [email or web address], letter or phone within 4 business days after the clinic has received the results. If you do not hear from us within 7 days, please contact the clinic through [email or web address] or phone. If you have a critical or abnormal lab result, we will notify you by phone as soon as possible.  Submit refill requests through [employer] or call your pharmacy and they will forward the refill request to us. Please allow 3 business days for your refill to be completed.          Additional Information About Your Visit        MyChart Information     [employer] lets you send messages to your doctor, view your test results, renew your prescriptions, schedule appointments and more. To sign up, go to [email or web address] . Click on \"Log in\" on the left side of the screen, which will take you to the Welcome page. Then click on \"Sign up Now\" on the right side of the page.     You will be asked to enter the access code listed below, as well as some personal information. Please follow the directions to create your username and password.     Your access code is: [email or web address]  Expires: 3/5/2018  2:27 PM     Your access code will  in 90 days. If you need help or a new code, please call your " "[facility] or [phone].        Care EveryWhere ID     This is your Care EveryWhere ID. This could be used by other organizations to access your [facility] medical records  [id number]        Your Vitals Were     Pulse Temperature Height BMI (Body Mass Index)          92 98.5  F (36.9  C) (Tympanic) 5' 8\" (1.727 m) 46.38 kg/m2         Blood Pressure from Last 3 Encounters:   01/16/18 144/88   12/14/17 (!) 138/101   12/12/17 (!) 170/98    Weight from Last 3 Encounters:   01/16/18 (!) 305 lb (138.3 kg)   12/14/17 299 lb (135.6 kg)   12/12/17 282 lb (127.9 kg)              We Performed the Following     Asthma Action Plan (AAP)          Today's Medication Changes          These changes are accurate as of: 1/16/18  1:45 PM.  If you have any questions, ask your nurse or doctor.               Stop taking these medicines if you haven't already. Please contact your care team if you have questions.     gabapentin 300 MG capsule   Commonly known as:  NEURONTIN   Stopped by:  Gianni Mccann PA-C                    Primary Care Provider Office Phone # Fax #    [facility] [phone] [address] [facility] [address]        Equal Access to Services     MARKO VALERA AH: Hadii ignacio coker hadasho Soshannonali, waaxda luqadaha, qaybta kaalmada adeegyada, angelo hercules . So [facility] [phone].    ATENCIÓN: Si habla español, tiene a xie disposición servicios gratuitos de asistencia lingüística. Llame al [phone].    We comply with applicable federal civil rights laws and [place] laws. We do not discriminate on the basis of race, color, national origin, age, disability, sex, sexual orientation, or gender identity.            Thank you!     Thank you for choosing [facility]  for your care. Our goal is always to provide you with excellent care. Hearing back from our patients is one way we can continue to improve our services. Please take a few " minutes to complete the written survey that you may receive in the mail after your visit with us. Thank you!             Your Updated Medication List - Protect others around you: Learn how to safely use, store and throw away your medicines at www.disposemymeds.org.          This list is accurate as of: 1/16/18  1:45 PM.  Always use your most recent med list.                   Brand Name Dispense Instructions for use Diagnosis    ibuprofen 800 MG tablet    ADVIL/MOTRIN    28 tablet    Take 1 tablet (800 mg) by mouth every 6 hours as needed for moderate pain    Acute right-sided low back pain with right-sided sciatica

## 2018-01-16 NOTE — PROGRESS NOTES
HPI      SUBJECTIVE:   Rafael Duran is a 27 year old male who presents to clinic today for follow-up after physical therapy for his back pain.  He would like to return to work without restrictions.  He is doing very well and having no problems and therapy has released him.    Patient is following up with his low back pain. He states that it is much better and he needs a letter to go back to work.    Problem list and histories reviewed & adjusted, as indicated.  Additional history: as documented    Patient Active Problem List   Diagnosis     Obesity     CARDIOVASCULAR SCREENING; LDL GOAL LESS THAN 130     Intermittent asthma     Nicotine abuse     Bilateral low back pain without sciatica     Past Surgical History:   Procedure Laterality Date     NONE         Social History   Substance Use Topics     Smoking status: Current Every Day Smoker     Years: 8.00     Types: Cigarettes     Smokeless tobacco: Never Used      Comment: 5-6 ciggarettes per day as of 7/16/2014      Alcohol use Yes      Comment: Rare- once a month      Family History   Problem Relation Age of Onset     Hypertension Paternal Grandfather      Asthma Paternal Grandfather      Asthma Father      Asthma Paternal Grandmother      Respiratory Paternal Grandmother      SHARLENE     Asthma Paternal Uncle      Asthma Paternal Aunt      DIABETES No family hx of      Cancer - colorectal No family hx of      C.A.D. No family hx of      CEREBROVASCULAR DISEASE No family hx of          Current Outpatient Prescriptions   Medication Sig Dispense Refill     ibuprofen (ADVIL/MOTRIN) 800 MG tablet Take 1 tablet (800 mg) by mouth every 6 hours as needed for moderate pain 28 tablet 0     No Known Allergies  Labs reviewed in EPIC      Reviewed and updated as needed this visit by clinical staff       Reviewed and updated as needed this visit by Provider       Review of Systems   Constitutional: Negative for diaphoresis, fever, malaise/fatigue and weight loss.   HENT:  Negative for congestion, ear discharge, ear pain, hearing loss, nosebleeds and sore throat.    Eyes: Negative for blurred vision, double vision, photophobia, pain, discharge and redness.   Respiratory: Negative for cough, hemoptysis, sputum production, shortness of breath and wheezing.    Cardiovascular: Negative for chest pain, palpitations, orthopnea and leg swelling.   Gastrointestinal: Negative for abdominal pain, blood in stool, constipation, diarrhea, heartburn, melena, nausea and vomiting.   Genitourinary: Negative.  Negative for dysuria, frequency and urgency.   Musculoskeletal: Positive for back pain and myalgias. Negative for joint pain and neck pain.   Skin: Negative for itching and rash.   Neurological: Negative.  Negative for dizziness, tingling, sensory change, focal weakness, seizures, loss of consciousness, weakness and headaches.   Endo/Heme/Allergies: Negative.    Psychiatric/Behavioral: Negative for depression, hallucinations, substance abuse and suicidal ideas. The patient is not nervous/anxious and does not have insomnia.          Physical Exam   Constitutional: He is oriented to person, place, and time and well-developed, well-nourished, and in no distress.   HENT:   Head: Normocephalic and atraumatic.   Right Ear: External ear normal.   Left Ear: External ear normal.   Nose: Nose normal.   Mouth/Throat: Oropharynx is clear and moist.   Eyes: Conjunctivae and EOM are normal. Pupils are equal, round, and reactive to light. Right eye exhibits no discharge. Left eye exhibits no discharge. No scleral icterus.   Neck: Normal range of motion. Neck supple. No thyromegaly present.   Cardiovascular: Normal rate, regular rhythm, normal heart sounds and intact distal pulses.  Exam reveals no gallop and no friction rub.    No murmur heard.  Pulmonary/Chest: Effort normal and breath sounds normal. No respiratory distress. He has no wheezes. He has no rales. He exhibits no tenderness.   Abdominal: Soft.  Bowel sounds are normal. He exhibits no distension and no mass. There is no tenderness. There is no rebound and no guarding.   Musculoskeletal: He exhibits no edema.        Lumbar back: He exhibits tenderness. He exhibits normal range of motion, no pain and no spasm.   Lymphadenopathy:     He has no cervical adenopathy.   Neurological: He is alert and oriented to person, place, and time. He has normal reflexes. No cranial nerve deficit. He exhibits normal muscle tone. Gait normal. Coordination normal.   Skin: Skin is warm and dry. No rash noted. No erythema.   Psychiatric: Mood, memory, affect and judgment normal.       (M54.5,  G89.29) Chronic bilateral low back pain without sciatica  (primary encounter diagnosis)  Comment:   Plan:    Pain is much improved and he has been released from physical therapy to return to work.  Work note was written today with no restrictions

## 2018-01-16 NOTE — NURSING NOTE
"Chief Complaint   Patient presents with     Back Pain     Follow up     Patient Request for Note/Letter     Return to work       Initial /88 (BP Location: Right arm, Cuff Size: Adult Large)  Pulse 92  Temp 98.5  F (36.9  C) (Tympanic)  Ht 5' 8\" (1.727 m)  Wt (!) 305 lb (138.3 kg)  BMI 46.38 kg/m2 Estimated body mass index is 46.38 kg/(m^2) as calculated from the following:    Height as of this encounter: 5' 8\" (1.727 m).    Weight as of this encounter: 305 lb (138.3 kg).  Medication Reconciliation: complete    Health Maintenance that is potentially due pending provider review:  NONE    Is there anyone who you would like to be able to receive your results? No  If yes have patient fill out YI      "

## 2018-03-19 NOTE — PROGRESS NOTES
Outpatient Physical Therapy Discharge Note     Patient: Rafael Duran  : 1990    Beginning/End Dates of Reporting Period:  17 to 2018    Referring Provider: Guanakito Velázquez MD    Therapy Diagnosis: R SI joint dysfunction w R radicular symptoms      Client Self Report: Pt relates he is approx 75% better. Pt is trying to f/u with the MD however he has not been able to get in.     Objective Measurements:  Objective Measure: lumbar ROM  Details: flex:6 in below knees ext: 100%     Objective Measure: ambulation     Objective Measure: Posture  Details: min L shift.          Outcome Measures (most recent score):  Debbi STarT    Debbi STarT         Goals:  Goal Identifier lumbar ROM   Goal Description Pt will be able to demonstrate full lumbar ROM in order to be able to  object off of the ground without pain or radicular symptoms.   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    Target Date 18   Date Met      Progress:not assessed at last visit      Progress Toward Goals:   Progress this reporting period: Pt was seen for 5 visits in physical therapy over this POC. Objective measures are all taken from last visit. At time of last visit pt was improving however lateral shift was still noted thus planned to continue on a weekly basis Pt has  failed to schedule f/u visits within 30 days from last visit as instructed thus is being d/c from therapy at this time. Current status is unknown at this time.  Per chart review pt did return to work w/o restrictions       Plan:  Discharge from therapy.    Discharge:    Reason for Discharge: Patient has failed to schedule further appointments.    Equipment Issued: NA    Discharge Plan: Patient to continue home program.    Magdalena Guerrero  Physical Therapist  [address] [address]  [email or web address]   [email or web address]   Office: [phone] Fax: [phone]

## 2018-10-15 ENCOUNTER — HOSPITAL ENCOUNTER (EMERGENCY)
Facility: CLINIC | Age: 28
Discharge: HOME | End: 2018-10-15
Attending: PHYSICIAN ASSISTANT | Admitting: PHYSICIAN ASSISTANT

## 2018-10-15 VITALS
TEMPERATURE: 98.1 F | SYSTOLIC BLOOD PRESSURE: 154 MMHG | DIASTOLIC BLOOD PRESSURE: 89 MMHG | HEART RATE: 94 BPM | OXYGEN SATURATION: 96 %

## 2018-10-15 DIAGNOSIS — J45.21 MILD INTERMITTENT ASTHMA WITH ACUTE EXACERBATION: ICD-10-CM

## 2018-10-15 DIAGNOSIS — J06.9 VIRAL URI WITH COUGH: ICD-10-CM

## 2018-10-15 PROCEDURE — 99213 OFFICE O/P EST LOW 20 MIN: CPT | Mod: Z6 | Performed by: PHYSICIAN ASSISTANT

## 2018-10-15 PROCEDURE — G0463 HOSPITAL OUTPT CLINIC VISIT: HCPCS | Performed by: PHYSICIAN ASSISTANT

## 2018-10-15 RX ORDER — ALBUTEROL SULFATE 90 UG/1
2 AEROSOL, METERED RESPIRATORY (INHALATION) EVERY 6 HOURS PRN
Qty: 1 INHALER | Refills: 0 | Status: SHIPPED | OUTPATIENT
Start: 2018-10-15 | End: 2020-02-04

## 2018-10-15 RX ORDER — ALBUTEROL SULFATE 0.83 MG/ML
2.5 SOLUTION RESPIRATORY (INHALATION) EVERY 4 HOURS PRN
Qty: 1 BOX | Refills: 0 | Status: SHIPPED | OUTPATIENT
Start: 2018-10-15 | End: 2020-02-04

## 2018-10-15 RX ORDER — PREDNISONE 20 MG/1
TABLET ORAL
Qty: 10 TABLET | Refills: 0 | Status: SHIPPED | OUTPATIENT
Start: 2018-10-15 | End: 2020-02-04

## 2018-10-15 ASSESSMENT — ENCOUNTER SYMPTOMS
VOMITING: 0
RHINORRHEA: 1
HEADACHES: 0
PALPITATIONS: 0
WHEEZING: 1
CHEST TIGHTNESS: 0
ABDOMINAL DISTENTION: 0
DIARRHEA: 0
FATIGUE: 0
SORE THROAT: 1
FEVER: 0
NAUSEA: 0
COUGH: 1
SHORTNESS OF BREATH: 0

## 2018-10-15 NOTE — ED AVS SNAPSHOT
[facility] Emergency Department    [address] [facility] [address] [facility] [address]    Phone:  [phone]    Fax:  [phone]                                       Rafael Duran   MRN: [id number]    Department:  [facility] Emergency Department   Date of Visit:  10/15/2018           After Visit Summary Signature Page     I have received my discharge instructions, and my questions have been answered. I have discussed any challenges I see with this plan with the nurse or doctor.    ..........................................................................................................................................  Patient/Patient Representative Signature      ..........................................................................................................................................  Patient Representative Print Name and Relationship to Patient    ..................................................               ................................................  Date                                   Time    ..........................................................................................................................................  Reviewed by Signature/Title    ...................................................              ..............................................  Date                                               Time          22EPIC Rev 08/18

## 2018-10-15 NOTE — DISCHARGE INSTRUCTIONS
Use Medication as directed; no antibiotics indicated at this time.     Patient given refill of albuterol neb solution and rescue inhaler. Patient to start prednisone as directed.     Hydrate with fluids, rest, cool humidifier.    For your Cough   The Buckwheat Honey Dose: Given   hour Prior to Bedtime  For children age under 1 year -Do not use due to botulism risk     2-5 years -  tsp (2.5 mL)    6-11 years -1 tsp (5 mL)    12-18 years -2 tsp (10 mL)     Guaifenesin     Adult dose -Not to exceed 2.4 g (2400mg) per day    Child age 6-12 years -100 mg every 4 hr, not to exceed 1.2 g (1200mg) per day     Pediatric, 2-6 years -50 mg every 4 hr as needed, not to exceed 600 mg per day    Cough medications is not recommended in children under 2 years.  With use of cough medications have combination medications be aware of products in the cough medication you are using to avoid overdose    Follow up with PCP in 5 days if symptoms fail to improve.    Go to Emergency Room if sx worsen or change, Shortness of breath, chest pain, persistent fevers, or painful breathing occur.     Patient voiced understanding of instructions given.

## 2018-10-15 NOTE — ED AVS SNAPSHOT
[facility] Emergency Department    [address] [facility] [address]    Phone:  [phone]    Fax:  [phone]                                       Rafael Duran   MRN: [id number]    Department:  [facility] Emergency Department   Date of Visit:  10/15/2018           Patient Information     Date Of Birth          1990        Your diagnoses for this visit were:     Viral URI with cough     Mild intermittent asthma with acute exacerbation        You were seen by Rosemarie Garcia PA-C.      Follow-up Information     Follow up with [facility] In 5 days.    Specialty:  Clinic    Why:  As needed    Contact information:    [address] [place] [address]  [phone]          Follow up with [facility] Emergency Department.    Specialty:  EMERGENCY MEDICINE    Why:  As needed, If symptoms worsen    Contact information:    [address] [place] [address] [phone]    Additional information:    The medical center is located at   [address] [place] [address]. (between [address] in [place], four miles north   of [place]).        Discharge Instructions       Use Medication as directed; no antibiotics indicated at this time.     Patient given refill of albuterol neb solution and rescue inhaler. Patient to start prednisone as directed.     Hydrate with fluids, rest, cool humidifier.    For your Cough   The Buckwheat Honey Dose: Given   hour Prior to Bedtime  For children age under 1 year -Do not use due to botulism risk     2-5 years -  tsp (2.5 mL)    6-11 years -1 tsp (5 mL)    12-18 years -2 tsp (10 mL)     Guaifenesin     Adult dose -Not to exceed 2.4 g (2400mg) per day    Child age 6-12 years -100 mg every 4 hr, not to exceed 1.2 g (1200mg) per day     Pediatric, 2-6 years -50 mg every 4 hr as needed, not to exceed 600 mg per day    Cough medications is not recommended in children under 2 years.  With use of cough medications have  combination medications be aware of products in the cough medication you are using to avoid overdose    Follow up with PCP in 5 days if symptoms fail to improve.    Go to Emergency Room if sx worsen or change, Shortness of breath, chest pain, persistent fevers, or painful breathing occur.     Patient voiced understanding of instructions given.            24 Hour Appointment Hotline       To make an appointment at any [facility], call [phone] ([phone]). If you don't have a family doctor or clinic, we will help you find one. [facility] are conveniently located to serve the needs of you and your family.             Review of your medicines      START taking        Dose / Directions Last dose taken    * albuterol (2.5 MG/3ML) 0.083% neb solution   Dose:  2.5 mg   Quantity:  1 Box        Take 1 vial (2.5 mg) by nebulization every 4 hours as needed for shortness of breath / dyspnea or wheezing   Refills:  0        * albuterol 108 (90 Base) MCG/ACT inhaler   Commonly known as:  PROAIR HFA/PROVENTIL HFA/VENTOLIN HFA   Dose:  2 puff   Quantity:  1 Inhaler        Inhale 2 puffs into the lungs every 6 hours as needed for shortness of breath / dyspnea or wheezing   Refills:  0        predniSONE 20 MG tablet   Commonly known as:  DELTASONE   Quantity:  10 tablet        Take two tablets (= 40mg) each day for 5 (five) days   Refills:  0        * Notice:  This list has 2 medication(s) that are the same as other medications prescribed for you. Read the directions carefully, and ask your doctor or other care provider to review them with you.      Our records show that you are taking the medicines listed below. If these are incorrect, please call your family doctor or clinic.        Dose / Directions Last dose taken    ibuprofen 800 MG tablet   Commonly known as:  ADVIL/MOTRIN   Dose:  800 mg   Quantity:  28 tablet        Take 1 tablet (800 mg) by mouth every 6 hours as needed for moderate pain   Refills:  0  "               Prescriptions were sent or printed at these locations (3 Prescriptions)                   [facility] [place], [place] - [address]   [address] [place], [place] [address]    Telephone:  [phone]   Fax:  [phone]   Hours:                  E-Prescribed (3 of 3)         albuterol (2.5 MG/3ML) 0.083% neb solution               albuterol (PROAIR HFA/PROVENTIL HFA/VENTOLIN HFA) 108 (90 Base) MCG/ACT inhaler               predniSONE (DELTASONE) 20 MG tablet                Orders Needing Specimen Collection     None      Pending Results     No orders found from 10/13/2018 to 10/16/2018.            Pending Culture Results     No orders found from 10/13/2018 to 10/16/2018.            Pending Results Instructions     If you had any lab results that were not finalized at the time of your Discharge, you can call the ED Lab Result RN at [phone]. You will be contacted by this team for any positive Lab results or changes in treatment. The nurses are available 7 days a week from 10A to 6:30P.  You can leave a message 24 hours per day and they will return your call.        Test Results From Your Hospital Stay               Thank you for choosing [facility]       Thank you for choosing [facility] for your care. Our goal is always to provide you with excellent care. Hearing back from our patients is one way we can continue to improve our services. Please take a few minutes to complete the written survey that you may receive in the mail after you visit with us. Thank you!        [employer] Information     wali lets you send messages to your doctor, view your test results, renew your prescriptions, schedule appointments and more. To sign up, go to [email or web address] . Click on \"Log in\" on the left side of the screen, which will take you to the Welcome page. Then click on \"Sign up Now\" on the right side of the page.     You will be asked to enter the access code listed below, as well as " some personal information. Please follow the directions to create your username and password.     Your access code is: [email or web address]  Expires: 2019 12:21 PM     Your access code will  in 90 days. If you need help or a new code, please call your [facility] clinic or [phone].        Care EveryWhere ID     This is your Care EveryWhere ID. This could be used by other organizations to access your [facility] medical records  [id number]        Equal Access to Services     MARKO VALERA : Shahid reneeo Sofederico, waaxda luqadaha, qaybta kaalmada adejulio, angelo hercules . So [facility] [phone].    ATENCIÓN: Si habla español, tiene a xie disposición servicios gratuitos de asistencia lingüística. Llame al [phone].    We comply with applicable federal civil rights laws and [place] laws. We do not discriminate on the basis of race, color, national origin, age, disability, sex, sexual orientation, or gender identity.            After Visit Summary       This is your record. Keep this with you and show to your community pharmacist(s) and doctor(s) at your next visit.

## 2018-10-15 NOTE — ED PROVIDER NOTES
History     Chief Complaint   Patient presents with     Cough     HPI    Rafael Duran is a 28 year old male who presents to the clinic today with a chief complaint of cough  and occasional wheezing for 1 week(s).  His cough is described as dry, slightly productive.    The patient's symptoms are mild and worsening.  Associated symptoms include nasal congestion, rhinorrhea, sore throat, and wheezing. The patient's symptoms are exacerbated by no particular triggers  Patient has been using albuterol nebs  to improve symptoms.  Patient has history of asthma and has been tobacco free for the past 2 months. Patient states he is currently out of all of his inhalers and nebs. Patient denies fevers, ear pain, sinus pain, shortness of breath, chest pain, abdominal pain, nausea/vomiting, or rash.     Problem List:    Patient Active Problem List    Diagnosis Date Noted     Bilateral low back pain without sciatica 01/27/2016     Priority: Medium     Nicotine abuse 02/25/2013     Priority: Medium     Intermittent asthma 02/01/2012     Priority: Medium     PFT 1/25/12 results:FVC=92%, FEV1=80%, FEV1/FVC=86%.  INTERPRETATION: Mild obstructive lung disease. Reversibility with bronchodilators is seen on testing today. Lung volumes are within normal limits. DLCO is increased. An increase in DLCO can be seen in the setting of erythrocytosis, left-to-right heart shunt, acute asthma, and occasionally in congestive heart failure.          CARDIOVASCULAR SCREENING; LDL GOAL LESS THAN 130 12/01/2011     Priority: Medium     Obesity 11/25/2011     Priority: Medium        Past Medical History:    Past Medical History:   Diagnosis Date     Asthma        Past Surgical History:    Past Surgical History:   Procedure Laterality Date     NONE         Family History:    Family History   Problem Relation Age of Onset     Hypertension Paternal Grandfather      Asthma Paternal Grandfather      Asthma Father      Asthma Paternal Grandmother       Respiratory Paternal Grandmother      SHARLENE     Asthma Paternal Uncle      Asthma Paternal Aunt      Diabetes No family hx of      Cancer - colorectal No family hx of      C.A.D. No family hx of      Cerebrovascular Disease No family hx of        Social History:  Marital Status:  Single [1]  Social History   Substance Use Topics     Smoking status: Current Every Day Smoker     Years: 8.00     Types: Cigarettes     Smokeless tobacco: Never Used      Comment: 5-6 ciggarettes per day as of 7/16/2014      Alcohol use Yes      Comment: Rare- once a month         Medications:      albuterol (2.5 MG/3ML) 0.083% neb solution   albuterol (PROAIR HFA/PROVENTIL HFA/VENTOLIN HFA) 108 (90 Base) MCG/ACT inhaler   predniSONE (DELTASONE) 20 MG tablet   ibuprofen (ADVIL/MOTRIN) 800 MG tablet         Review of Systems   Constitutional: Negative for fatigue and fever.   HENT: Positive for postnasal drip, rhinorrhea and sore throat.    Respiratory: Positive for cough and wheezing. Negative for chest tightness and shortness of breath.    Cardiovascular: Negative for chest pain and palpitations.   Gastrointestinal: Negative for abdominal distention, diarrhea, nausea and vomiting.   Skin: Negative for rash.   Neurological: Negative for headaches.   All other systems reviewed and are negative.      Physical Exam   BP: 154/89  Pulse: 94  Temp: 98.1  F (36.7  C)  SpO2: 96 %      Physical Exam     /89  Pulse 94  Temp 98.1  F (36.7  C) (Oral)  SpO2 96%  GENERAL APPEARANCE: healthy, alert and no distress  EYES: EOMI,  PERRL, conjunctiva clear  HENT: TM's normal bilaterally and tonsillar erythema  NECK: supple, nontender, no lymphadenopathy  RESP: lungs clear to auscultation - no rales, rhonchi or wheezes  CV: regular rates and rhythm, normal S1 S2, no murmur noted  ABDOMEN:  soft, nontender, no HSM or masses and bowel sounds normal  NEURO: Normal strength and tone, sensory exam grossly normal,  normal speech and mentation  SKIN: no  suspicious lesions or rashes    No results found for this or any previous visit (from the past 24 hour(s)).    X-RAY: not indicated          ED Course     ED Course     Procedures              Critical Care time:  none               No results found for this or any previous visit (from the past 24 hour(s)).    Medications - No data to display    Assessments & Plan (with Medical Decision Making)     I have reviewed the nursing notes.    I have reviewed the findings, diagnosis, plan and need for follow up with the patient.   28 year old male presents to the urgent care with 1 week history of cough. Patient has history of asthma and tobacco use, but has been tobacco free for 2 months. Patient state he is all out of his neb treatments and rescue inhalers which seem to be improving the cough somewhat. Patient does state that his symptoms have worsened over the past week with cough that is occasionally productive and on and off wheezing; patient also states runny nose and congestion with sore throat. Patient denies fevers, shortness of breath, chest pain, abdominal pain/nausea/vomiting, rash, headaches. Patient does not appear ill or toxic in office today with physical exam consistent with a viral illness. Discussed with patient that this is likely viral in origin. Will refill patient's inhaler and nebulizer solution and will add prednisone for 5 days for asthma exacerbation due to viral illness (bronchitis). Patient to return to Emergency Room if fevers, worsening/change in symptoms occur.     Discharge Medication List as of 10/15/2018 12:21 PM      START taking these medications    Details   albuterol (2.5 MG/3ML) 0.083% neb solution Take 1 vial (2.5 mg) by nebulization every 4 hours as needed for shortness of breath / dyspnea or wheezing, Disp-1 Box, R-0, E-Prescribe      albuterol (PROAIR HFA/PROVENTIL HFA/VENTOLIN HFA) 108 (90 Base) MCG/ACT inhaler Inhale 2 puffs into the lungs every 6 hours as needed for shortness  of breath / dyspnea or wheezing, Disp-1 Inhaler, R-0, E-Prescribe      predniSONE (DELTASONE) 20 MG tablet Take two tablets (= 40mg) each day for 5 (five) days, Disp-10 tablet, R-0, E-Prescribe             Final diagnoses:   Viral URI with cough   Mild intermittent asthma with acute exacerbation       10/15/2018   [facility] EMERGENCY DEPARTMENT     Rosemarie Garcia PA-C  10/15/18 [id number]

## 2019-11-03 ENCOUNTER — ANCILLARY PROCEDURE (OUTPATIENT)
Dept: GENERAL RADIOLOGY | Facility: CLINIC | Age: 29
End: 2019-11-03
Attending: PHYSICIAN ASSISTANT
Payer: COMMERCIAL

## 2019-11-03 ENCOUNTER — OFFICE VISIT (OUTPATIENT)
Dept: URGENT CARE | Facility: URGENT CARE | Age: 29
End: 2019-11-03
Payer: COMMERCIAL

## 2019-11-03 VITALS
WEIGHT: 315 LBS | HEIGHT: 69 IN | TEMPERATURE: 101.3 F | OXYGEN SATURATION: 96 % | SYSTOLIC BLOOD PRESSURE: 136 MMHG | DIASTOLIC BLOOD PRESSURE: 78 MMHG | BODY MASS INDEX: 46.65 KG/M2 | RESPIRATION RATE: 24 BRPM | HEART RATE: 110 BPM

## 2019-11-03 DIAGNOSIS — R05.9 COUGH: ICD-10-CM

## 2019-11-03 DIAGNOSIS — R50.9 FEVER, UNSPECIFIED FEVER CAUSE: ICD-10-CM

## 2019-11-03 DIAGNOSIS — J45.21 MILD INTERMITTENT ASTHMA WITH EXACERBATION: Primary | ICD-10-CM

## 2019-11-03 DIAGNOSIS — J06.9 VIRAL UPPER RESPIRATORY TRACT INFECTION: ICD-10-CM

## 2019-11-03 LAB
FLUAV+FLUBV AG SPEC QL: NEGATIVE
FLUAV+FLUBV AG SPEC QL: NEGATIVE
SPECIMEN SOURCE: NORMAL

## 2019-11-03 PROCEDURE — 71046 X-RAY EXAM CHEST 2 VIEWS: CPT

## 2019-11-03 PROCEDURE — 87804 INFLUENZA ASSAY W/OPTIC: CPT | Performed by: PHYSICIAN ASSISTANT

## 2019-11-03 PROCEDURE — 99214 OFFICE O/P EST MOD 30 MIN: CPT | Mod: 25 | Performed by: PHYSICIAN ASSISTANT

## 2019-11-03 RX ORDER — IPRATROPIUM BROMIDE AND ALBUTEROL SULFATE 2.5; .5 MG/3ML; MG/3ML
3 SOLUTION RESPIRATORY (INHALATION) ONCE
Status: COMPLETED
Start: 2019-11-03 | End: 2019-11-03

## 2019-11-03 RX ORDER — ALBUTEROL SULFATE 0.83 MG/ML
2.5 SOLUTION RESPIRATORY (INHALATION) EVERY 4 HOURS PRN
Qty: 25 VIAL | Refills: 0 | Status: SHIPPED | OUTPATIENT
Start: 2019-11-03 | End: 2019-12-05

## 2019-11-03 RX ORDER — ALBUTEROL SULFATE 90 UG/1
AEROSOL, METERED RESPIRATORY (INHALATION)
Qty: 18 G | Refills: 0 | Status: SHIPPED | OUTPATIENT
Start: 2019-11-03 | End: 2019-12-05

## 2019-11-03 RX ORDER — PREDNISONE 20 MG/1
40 TABLET ORAL DAILY
Qty: 10 TABLET | Refills: 0 | Status: SHIPPED | OUTPATIENT
Start: 2019-11-03 | End: 2020-02-04

## 2019-11-03 RX ADMIN — IPRATROPIUM BROMIDE AND ALBUTEROL SULFATE 3 ML: 2.5; .5 SOLUTION RESPIRATORY (INHALATION) at 12:14

## 2019-11-03 ASSESSMENT — ENCOUNTER SYMPTOMS
NAUSEA: 0
VOMITING: 0
EYE ITCHING: 0
EYE DISCHARGE: 0
ABDOMINAL PAIN: 0
CHILLS: 0
PALPITATIONS: 0
SINUS PRESSURE: 0
RHINORRHEA: 0
CONSTIPATION: 0
SINUS PAIN: 0
EYE PAIN: 0
UNEXPECTED WEIGHT CHANGE: 0
FATIGUE: 0
MYALGIAS: 0
COUGH: 1
SORE THROAT: 0
FEVER: 1
ARTHRALGIAS: 0
WHEEZING: 0
EYE REDNESS: 0
DIARRHEA: 0
SHORTNESS OF BREATH: 0

## 2019-11-03 ASSESSMENT — MIFFLIN-ST. JEOR: SCORE: 2699.67

## 2019-11-03 NOTE — PROGRESS NOTES
SUBJECTIVE:   Rafael Duran is a 29 year old male presenting with a chief complaint of   Chief Complaint   Patient presents with     Cough     Cough started yesterday and a cold has asthma wants to get treated before it gets worse because of asthma. Took therflu last night and cough medicine.       He is an established patient of [facility].    URI Adult    Onset of symptoms: has ongoing cough, fever and cough worsened yesterday   Course of illness is same.    Severity moderate  Current and Associated symptoms: fever and cough - productive  Treatment measures tried include Tylenol/Ibuprofen.  Predisposing factors include None.        Review of Systems   Constitutional: Positive for fever. Negative for chills, fatigue and unexpected weight change.   HENT: Negative for congestion, ear pain, rhinorrhea, sinus pressure, sinus pain and sore throat.    Eyes: Negative for pain, discharge, redness and itching.   Respiratory: Positive for cough. Negative for shortness of breath and wheezing.    Cardiovascular: Negative for chest pain, palpitations and leg swelling.   Gastrointestinal: Negative for abdominal pain, constipation, diarrhea, nausea and vomiting.   Musculoskeletal: Negative for arthralgias and myalgias.   Skin: Negative for rash.       Past Medical History:   Diagnosis Date     Asthma      Family History   Problem Relation Age of Onset     Hypertension Paternal Grandfather      Asthma Paternal Grandfather      Asthma Father      Asthma Paternal Grandmother      Respiratory Paternal Grandmother         SHARLENE     Asthma Paternal Uncle      Asthma Paternal Aunt      Diabetes No family hx of      Cancer - colorectal No family hx of      C.A.D. No family hx of      Cerebrovascular Disease No family hx of      Current Outpatient Medications   Medication Sig Dispense Refill     albuterol (PROAIR HFA/PROVENTIL HFA/VENTOLIN HFA) 108 (90 Base) MCG/ACT inhaler Inhale 2 puffs every 4-6 hours as needed for cough, wheezing, or  "shortness of breath 18 g 0     albuterol (PROVENTIL) (2.5 MG/3ML) 0.083% neb solution Take 1 vial (2.5 mg) by nebulization every 4 hours as needed for shortness of breath / dyspnea or wheezing 25 vial 0     predniSONE (DELTASONE) 20 MG tablet Take 2 tablets (40 mg) by mouth daily for 5 days 10 tablet 0     albuterol (2.5 MG/3ML) 0.083% neb solution Take 1 vial (2.5 mg) by nebulization every 4 hours as needed for shortness of breath / dyspnea or wheezing (Patient not taking: Reported on 11/3/2019) 1 Box 0     albuterol (PROAIR HFA/PROVENTIL HFA/VENTOLIN HFA) 108 (90 Base) MCG/ACT inhaler Inhale 2 puffs into the lungs every 6 hours as needed for shortness of breath / dyspnea or wheezing (Patient not taking: Reported on 11/3/2019) 1 Inhaler 0     ibuprofen (ADVIL/MOTRIN) 800 MG tablet Take 1 tablet (800 mg) by mouth every 6 hours as needed for moderate pain (Patient not taking: Reported on 11/3/2019) 28 tablet 0     predniSONE (DELTASONE) 20 MG tablet Take two tablets (= 40mg) each day for 5 (five) days (Patient not taking: Reported on 11/3/2019) 10 tablet 0     Social History     Tobacco Use     Smoking status: Former Smoker     Packs/day: 0.00     Years: 8.00     Pack years: 0.00     Types: Cigarettes     Smokeless tobacco: Never Used     Tobacco comment: 5-6 ciggarettes per day as of 7/16/2014 . Stopped smoking 1 year ago.11/2/2019   Substance Use Topics     Alcohol use: Yes     Comment: Rare- once a month        OBJECTIVE  /78 (BP Location: Right arm, Patient Position: Sitting, Cuff Size: Adult Large)   Pulse 110   Temp 101.3  F (38.5  C) (Tympanic)   Resp 24   Ht 1.74 m (5' 8.5\")   Wt (!) 175.7 kg (387 lb 6.4 oz)   SpO2 96%   BMI 58.05 kg/m      Physical Exam  Constitutional:       General: He is not in acute distress.     Appearance: He is well-developed.   HENT:      Head: Normocephalic and atraumatic.      Right Ear: Tympanic membrane and ear canal normal.      Left Ear: Tympanic membrane and ear " canal normal.   Eyes:      Conjunctiva/sclera: Conjunctivae normal.      Pupils: Pupils are equal, round, and reactive to light.   Cardiovascular:      Rate and Rhythm: Normal rate and regular rhythm.   Pulmonary:      Effort: Pulmonary effort is normal.      Comments: Slight diffuse wheeze. SO2 increased from 93% to 96% following duoneb given in clinic   Skin:     General: Skin is warm and dry.      Findings: No rash.   Psychiatric:         Behavior: Behavior normal.         Labs:  Results for orders placed or performed in visit on 11/03/19 (from the past 24 hour(s))   Influenza A/B antigen   Result Value Ref Range    Influenza A/B Agn Specimen Nasopharyngeal     Influenza A Negative NEG^Negative    Influenza B Negative NEG^Negative       X-Ray was done, my findings are: no acute consolidation     ASSESSMENT:      ICD-10-CM    1. Mild intermittent asthma with exacerbation J45.21 predniSONE (DELTASONE) 20 MG tablet     albuterol (PROAIR HFA/PROVENTIL HFA/VENTOLIN HFA) 108 (90 Base) MCG/ACT inhaler     albuterol (PROVENTIL) (2.5 MG/3ML) 0.083% neb solution   2. Viral upper respiratory tract infection J06.9    3. Cough R05 XR Chest 2 Views     Influenza A/B antigen     INHALATION/NEBULIZER TREATMENT, INITIAL     ipratropium - albuterol 0.5 mg/2.5 mg/3 mL (DUONEB) neb solution 3 mL   4. Fever, unspecified fever cause R50.9 XR Chest 2 Views     Influenza A/B antigen        Medical Decision Making:    Differential Diagnosis:  URI Adult/Peds:  Asthma, Asthma exacerbation, Bronchitis-viral, Bronchospasm, Influenza, Pneumonia, Viral syndrome and Viral upper respiratory illness    Serious Comorbid Conditions:  Adult:  Asthma    PLAN:    URI Adult:  Influenza negative. Chest x-ray is clear. Will treat with prednisone 40mg once daily x 5 days and albuterol inhaler or neb every 4-6hrs as needed. Discussed how to take/use these medications and what to expect. Get plenty of rest and push fluids. Can use Tylenol and/or ibuprofen  as needed for pain and/or fever control. Return to clinic if symptoms worsen or do not improve; otherwise follow up as needed.      Followup:    If not improving or if condition worsens, follow up with your Primary Care Provider    There are no Patient Instructions on file for this visit.

## 2019-11-03 NOTE — NURSING NOTE
Clinic Administered Medication Documentation    MEDICATION LIST: Inhalable/Nebs Medication Documentation    Patient was given Ipratropium-Albuterol Neb. Prior to medication administration, verified patients identity using patient s name and date of birth. Please see MAR and medication order for additional information.     E:2/21  [facility]

## 2020-02-04 ENCOUNTER — OFFICE VISIT (OUTPATIENT)
Dept: FAMILY MEDICINE | Facility: CLINIC | Age: 30
End: 2020-02-04
Payer: COMMERCIAL

## 2020-02-04 VITALS
HEART RATE: 102 BPM | RESPIRATION RATE: 18 BRPM | WEIGHT: 315 LBS | BODY MASS INDEX: 46.65 KG/M2 | OXYGEN SATURATION: 97 % | DIASTOLIC BLOOD PRESSURE: 80 MMHG | SYSTOLIC BLOOD PRESSURE: 128 MMHG | HEIGHT: 69 IN | TEMPERATURE: 98.2 F

## 2020-02-04 DIAGNOSIS — J45.21 MILD INTERMITTENT ASTHMA WITH EXACERBATION: ICD-10-CM

## 2020-02-04 DIAGNOSIS — E66.01 MORBID OBESITY (H): ICD-10-CM

## 2020-02-04 PROCEDURE — 99214 OFFICE O/P EST MOD 30 MIN: CPT | Performed by: NURSE PRACTITIONER

## 2020-02-04 RX ORDER — ALBUTEROL SULFATE 0.83 MG/ML
2.5 SOLUTION RESPIRATORY (INHALATION) EVERY 4 HOURS PRN
Qty: 1 BOX | Refills: 0 | Status: SHIPPED | OUTPATIENT
Start: 2020-02-04 | End: 2020-03-13

## 2020-02-04 RX ORDER — ALBUTEROL SULFATE 90 UG/1
AEROSOL, METERED RESPIRATORY (INHALATION)
Qty: 18 G | Refills: 0 | Status: SHIPPED | OUTPATIENT
Start: 2020-02-04 | End: 2020-03-13

## 2020-02-04 RX ORDER — FLUTICASONE PROPIONATE AND SALMETEROL XINAFOATE 115; 21 UG/1; UG/1
2 AEROSOL, METERED RESPIRATORY (INHALATION) 2 TIMES DAILY
Qty: 1 INHALER | Refills: 3 | Status: SHIPPED | OUTPATIENT
Start: 2020-02-04 | End: 2020-07-07

## 2020-02-04 ASSESSMENT — ASTHMA QUESTIONNAIRES
QUESTION_3 LAST FOUR WEEKS HOW OFTEN DID YOUR ASTHMA SYMPTOMS (WHEEZING, COUGHING, SHORTNESS OF BREATH, CHEST TIGHTNESS OR PAIN) WAKE YOU UP AT NIGHT OR EARLIER THAN USUAL IN THE MORNING: ONCE A WEEK
ACT_TOTALSCORE: 16
QUESTION_4 LAST FOUR WEEKS HOW OFTEN HAVE YOU USED YOUR RESCUE INHALER OR NEBULIZER MEDICATION (SUCH AS ALBUTEROL): TWO OR THREE TIMES PER WEEK
QUESTION_2 LAST FOUR WEEKS HOW OFTEN HAVE YOU HAD SHORTNESS OF BREATH: ONCE A DAY
QUESTION_1 LAST FOUR WEEKS HOW MUCH OF THE TIME DID YOUR ASTHMA KEEP YOU FROM GETTING AS MUCH DONE AT WORK, SCHOOL OR AT HOME: NONE OF THE TIME
QUESTION_5 LAST FOUR WEEKS HOW WOULD YOU RATE YOUR ASTHMA CONTROL: SOMEWHAT CONTROLLED

## 2020-02-04 ASSESSMENT — MIFFLIN-ST. JEOR: SCORE: 2720.54

## 2020-02-04 NOTE — PATIENT INSTRUCTIONS
Patient Education   Advair Diskus Inhaler  Medicines: Fluticasone (nyll-SJN-p-sone) and Salmeterol (sal-CIERRA-ter-ahl)  What it does  This inhaler contains two medicines that relax the muscles in your airway. They also decrease inflammation, swelling and mucus.  Use every day to prevent symptoms, even if you feel well. Do not use for fast relief.     How to use this inhaler  1. Wash and dry your hands well.  2. Open the inhaler. Hold it in a level, flat position. Do not shake this inhaler.  3. Slide the lever from left to right until it clicks.  4. Exhale (breathe out) through mouth away from inhaler.  5. Place the mouthpiece into your mouth and close your lips tightly around it.  6. Keep the inhaler level while you inhale with a quick, deep breath.  7. Remove the mouthpiece and hold your breath for 10 seconds or as long as you can.  8. Breathe out through your mouth slowly and away from the inhaler. Close the inhaler.  This inhaler contains Salmeterol, a LABA class medicine.  Do not use with other LABA containing medicines.  9. Rinse your mouth or brush your teeth and spit out the water. Do not swallow.  Cleaning    Wipe the outside with dry tissue or cloth.    Do not wash any part of the inhaler. Keep dry.    Store inhaler in a cool, dry place.  How to tell if the inhaler is empty  The inhaler has no more doses when the counter reads 0. Numbers 5 to 0 are red to remind you to get a refill.  If you have questions about the use of your inhaler, please ask your pharmacist or provider.  For more information and video demos, go to [email or web address]/[email or web address].  For informational purposes only. Not to replace the advice of your health care provider.  Copyright   2013 [facility]. All rights reserved. [employer] [id number] - REV 12/15.       Patient Education     Controlling Your Asthma  You can do a lot to manage your asthma and improve your quality of life. You will need to work with your healthcare provider  to develop a plan. But it s up to you to put this plan into action.  Why you need to take control  You need to control the inflammation in your lungs. Take all medicine as directed, especially controller medicines, even if you feel that your asthma is under good control. You also need to relieve symptoms when you have them. These are long-term tasks. But the more you stay in control, the better you ll feel. If you don t stay in control:    Asthma symptoms may cause you to miss school, work, or activities that you enjoy.    Asthma flare-ups can be dangerous, even deadly.    Uncontrolled asthma makes it more likely that you will need emergency department and in-hospital care.    Uncontrolled asthma may cause permanent damage to your lungs.    Peak flow monitoring helps measure how open your airways are.   Taking medicine helps you control your asthma and relieve symptoms when they occur.     Using an Asthma Action Plan will help you keep track of and respond to asthma symptoms.   Avoiding triggers--the things that inflame your airways--will help prevent symptoms and flare-ups.   Your action plan  Your healthcare provider will help you prepare, and when needed, update your personal Asthma Action Plan. Your plan tells you what to do based on your current symptoms. If you don't have an Asthma Action Plan, or if yours isn't up-to-date, make sure you talk with your healthcare provider.  Date Last Reviewed: 1/1/2017 2000-2019 [employer]. [address] [place] [address], [place] [address]. All rights reserved. This information is not intended as a substitute for professional medical care. Always follow your healthcare professional's instructions.

## 2020-02-04 NOTE — PROGRESS NOTES
Subjective     Rafael Duran is a 29 year old male who presents to clinic today for the following health issues:    HPI   Asthma Follow-Up    Was ACT completed today?    Yes    ACT Total Scores 12/14/2017   ACT TOTAL SCORE -   ASTHMA ER VISITS -   ASTHMA HOSPITALIZATIONS -   ACT TOTAL SCORE (Goal Greater than or Equal to 20) 22   In the past 12 months, how many times did you visit the emergency room for your asthma without being admitted to the hospital? 0   In the past 12 months, how many times were you hospitalized overnight because of your asthma? 0       How many days per week do you miss taking your asthma controller medication?  I do not have an asthma controller medication    Please describe any recent triggers for your asthma: None    Have you had any Emergency Room Visits, Urgent Care Visits, or Hospital Admissions since your last office visit?  No      Patient Active Problem List   Diagnosis     Obesity     CARDIOVASCULAR SCREENING; LDL GOAL LESS THAN 130     Intermittent asthma     Nicotine abuse     Bilateral low back pain without sciatica     Morbid obesity (H)     Past Surgical History:   Procedure Laterality Date     NONE         Social History     Tobacco Use     Smoking status: Former Smoker     Packs/day: 0.00     Years: 8.00     Pack years: 0.00     Types: Cigarettes     Smokeless tobacco: Never Used     Tobacco comment: 5-6 ciggarettes per day as of 7/16/2014 . Stopped smoking 1 year ago.11/2/2019   Substance Use Topics     Alcohol use: Yes     Comment: Rare- once a month      Family History   Problem Relation Age of Onset     Hypertension Paternal Grandfather      Asthma Paternal Grandfather      Asthma Father      Asthma Paternal Grandmother      Respiratory Paternal Grandmother         SHARLENE     Asthma Paternal Uncle      Asthma Paternal Aunt      Diabetes No family hx of      Cancer - colorectal No family hx of      C.A.D. No family hx of      Cerebrovascular Disease No family hx of           Current Outpatient Medications   Medication Sig Dispense Refill     albuterol (PROAIR HFA/PROVENTIL HFA/VENTOLIN HFA) 108 (90 Base) MCG/ACT inhaler INHALE 2 PUFFS BY MOUTH EVERY 4 TO 6 HOURS AS NEEDED FOR COUGH, WHEEZING, OR SHORTNESS OF BREATH 18 g 0     albuterol (PROVENTIL) (2.5 MG/3ML) 0.083% neb solution Take 1 vial (2.5 mg) by nebulization every 4 hours as needed for shortness of breath / dyspnea or wheezing 1 Box 0     fluticasone-salmeterol (ADVAIR-HFA) 115-21 MCG/ACT inhaler Inhale 2 puffs into the lungs 2 times daily 1 Inhaler 3     No Known Allergies  No lab results found.   BP Readings from Last 3 Encounters:   02/04/20 128/80   11/03/19 136/78   10/15/18 154/89    Wt Readings from Last 3 Encounters:   02/04/20 (!) 177.8 kg (392 lb)   11/03/19 (!) 175.7 kg (387 lb 6.4 oz)   01/16/18 (!) 138.3 kg (305 lb)               Reviewed and updated as needed this visit by Provider         Review of Systems   ROS COMP: Constitutional, HEENT, cardiovascular, pulmonary, GI, , musculoskeletal, neuro, skin, endocrine and psych systems are negative, except as otherwise noted.      Objective    There were no vitals taken for this visit.  There is no height or weight on file to calculate BMI.  Physical Exam   GENERAL: healthy, alert and no distress  EYES: Eyes grossly normal to inspection, PERRL and conjunctivae and sclerae normal  HENT: ear canals and TM's normal, nose and mouth without ulcers or lesions  NECK: no adenopathy, no asymmetry, masses, or scars and thyroid normal to palpation  RESP: lungs clear to auscultation - no rales, rhonchi or wheezes  CV: regular rate and rhythm, normal S1 S2, no S3 or S4, no murmur, click or rub, no peripheral edema and peripheral pulses strong  ABDOMEN: soft, nontender, no hepatosplenomegaly, no masses and bowel sounds normal  MS: no gross musculoskeletal defects noted, no edema  SKIN: no suspicious lesions or rashes  NEURO: Normal strength and tone, mentation intact and speech  "normal  PSYCH: mentation appears normal, affect normal/bright            Assessment & Plan     (J45.21) Mild intermittent asthma with exacerbation  Comment: Patient has not been on his inhalers inhalers renewed today will have follow-up in 1 to 2 months sooner if breathing not improved  Plan: albuterol (PROAIR HFA/PROVENTIL HFA/VENTOLIN         HFA) 108 (90 Base) MCG/ACT inhaler, albuterol         (PROVENTIL) (2.5 MG/3ML) 0.083% neb solution,         fluticasone-salmeterol (ADVAIR-HFA) 115-21         MCG/ACT inhaler          (E66.01) Morbid obesity (H)  Comment: Concern for sleep apnea will have him evaluated  Plan: SLEEP EVALUATION & MANAGEMENT REFERRAL - ADULT         -[employer] [facility]         [phone] (Age 13 and up if over 100 lbs)            BMI:   Estimated body mass index is 58.74 kg/m  as calculated from the following:    Height as of this encounter: 1.74 m (5' 8.5\").    Weight as of this encounter: 177.8 kg (392 lb).   Weight management plan: Discussed healthy diet and exercise guidelines        See Patient Instructions    Return in about 1 month (around 3/4/2020) for Asthma.    HOLLY Madsen CNP  [facility]      "

## 2020-02-05 ASSESSMENT — ASTHMA QUESTIONNAIRES: ACT_TOTALSCORE: 16

## 2020-03-12 DIAGNOSIS — J45.21 MILD INTERMITTENT ASTHMA WITH EXACERBATION: ICD-10-CM

## 2020-03-12 NOTE — TELEPHONE ENCOUNTER
"Requested Prescriptions   Pending Prescriptions Disp Refills     albuterol (VENTOLIN HFA) 108 (90 Base) MCG/ACT inhaler [Pharmacy Med Name: VENTOLIN HFA 108MCG/ACT AERS] 18 g 0     Sig: INHALE TWO PUFFS BY MOUTH EVERY 4-6 HOURS AS NEEDED FOR COUGH, WHEEZING OR FOR SHORTNESS OF BREATH       Asthma Maintenance Inhalers - Anticholinergics Failed - 3/12/2020 12:00 AM        Failed - Asthma control assessment score within normal limits in last 6 months     Please review ACT score.           Passed - Patient is age 12 years or older        Passed - Medication is active on med list        Passed - Recent (6 mo) or future (30 days) visit within the authorizing provider's specialty     Patient had office visit in the last 6 months or has a visit in the next 30 days with authorizing provider or within the authorizing provider's specialty.  See \"Patient Info\" tab in inbasket, or \"Choose Columns\" in Meds & Orders section of the refill encounter.    .Last Written Prescription Date:  2/4/20  Last Fill Quantity: 18g,  # refills: 0   Last office visit: 2/4/2020 with prescribing provider:     Future Office Visit:                 Short-Acting Beta Agonist Inhalers Protocol  Failed - 3/12/2020 12:00 AM        Failed - Asthma control assessment score within normal limits in last 6 months     Please review ACT score.           Passed - Patient is age 12 or older        Passed - Medication is active on med list        Passed - Recent (6 mo) or future (30 days) visit within the authorizing provider's specialty     Patient had office visit in the last 6 months or has a visit in the next 30 days with authorizing provider or within the authorizing provider's specialty.  See \"Patient Info\" tab in inbasket, or \"Choose Columns\" in Meds & Orders section of the refill encounter.               albuterol (PROVENTIL) (2.5 MG/3ML) 0.083% neb solution [Pharmacy Med Name: ALBUTEROL SULFATE 2.5 MG/3ML NEBU] 90 mL 0     Sig: NEBULIZE CONTENTS OF ONE VIAL " "EVERY 4 HOURS AS NEEDED FOR SHORTNESS OF BREATH /DYSPNEA OR WHEEZING       Asthma Maintenance Inhalers - Anticholinergics Failed - 3/12/2020 12:00 AM        Failed - Asthma control assessment score within normal limits in last 6 months     Please review ACT score.           Passed - Patient is age 12 years or older        Passed - Medication is active on med list        Passed - Recent (6 mo) or future (30 days) visit within the authorizing provider's specialty     Patient had office visit in the last 6 months or has a visit in the next 30 days with authorizing provider or within the authorizing provider's specialty.  See \"Patient Info\" tab in inbasket, or \"Choose Columns\" in Meds & Orders section of the refill encounter.      Last Written Prescription Date:  2/4/20  Last Fill Quantity: 1 box,  # refills: 0   Last office visit: 2/4/2020 with prescribing provider:     Future Office Visit:             Short-Acting Beta Agonist Inhalers Protocol  Failed - 3/12/2020 12:00 AM        Failed - Asthma control assessment score within normal limits in last 6 months     Please review ACT score.           Passed - Patient is age 12 or older        Passed - Medication is active on med list        Passed - Recent (6 mo) or future (30 days) visit within the authorizing provider's specialty     Patient had office visit in the last 6 months or has a visit in the next 30 days with authorizing provider or within the authorizing provider's specialty.  See \"Patient Info\" tab in inbasket, or \"Choose Columns\" in Meds & Orders section of the refill encounter.               ACT Total Scores 1/27/2016 12/14/2017 2/4/2020   ACT TOTAL SCORE - - -   ASTHMA ER VISITS - - -   ASTHMA HOSPITALIZATIONS - - -   ACT TOTAL SCORE (Goal Greater than or Equal to 20) 23 22 16   In the past 12 months, how many times did you visit the emergency room for your asthma without being admitted to the hospital? 0 0 0   In the past 12 months, how many times were you " hospitalized overnight because of your asthma? 0 0 0

## 2020-03-13 RX ORDER — ALBUTEROL SULFATE 90 UG/1
AEROSOL, METERED RESPIRATORY (INHALATION)
Qty: 18 G | Refills: 0 | Status: SHIPPED | OUTPATIENT
Start: 2020-03-13 | End: 2020-07-07

## 2020-03-13 RX ORDER — ALBUTEROL SULFATE 0.83 MG/ML
SOLUTION RESPIRATORY (INHALATION)
Qty: 90 ML | Refills: 0 | Status: SHIPPED | OUTPATIENT
Start: 2020-03-13 | End: 2020-11-27

## 2020-03-13 NOTE — TELEPHONE ENCOUNTER
Routing refill request to provider for review/approval because:  Failed RN protocol, ACT is 16 on 2-4-2020.    TIMO Parikh

## 2020-03-13 NOTE — TELEPHONE ENCOUNTER
Notify patient I have refilled his albuterol but needs an appointment for further refills as if he is using this much albuterol per month his asthma  is very uncontrolled and needs to be seen.   please have him schedule an appointment   Haven Virk CNP

## 2020-03-25 ENCOUNTER — TELEPHONE (OUTPATIENT)
Dept: FAMILY MEDICINE | Facility: CLINIC | Age: 30
End: 2020-03-25

## 2020-03-25 NOTE — TELEPHONE ENCOUNTER
Panel Management Review      Patient has the following on his problem list:     Asthma review     ACT Total Scores 2/4/2020   ACT TOTAL SCORE -   ASTHMA ER VISITS -   ASTHMA HOSPITALIZATIONS -   ACT TOTAL SCORE (Goal Greater than or Equal to 20) 16   In the past 12 months, how many times did you visit the emergency room for your asthma without being admitted to the hospital? 0   In the past 12 months, how many times were you hospitalized overnight because of your asthma? 0      1. Is Asthma diagnosis on the Problem List? Yes    2. Is Asthma listed on Health Maintenance? Yes    3. Patient is due for:  ACT      Composite cancer screening  Chart review shows that this patient is due/due soon for the following None  Summary:    Patient is due/failing the following:   ACT    Action needed:   Patient needs to do ACT.    Type of outreach:    Phone, left message for patient to call back.     Questions for provider review:    None                                                                                                                                    Nicole Caro MA      Chart routed to Care Team .

## 2020-07-06 DIAGNOSIS — J45.21 MILD INTERMITTENT ASTHMA WITH EXACERBATION: ICD-10-CM

## 2020-07-07 RX ORDER — FLUTICASONE PROPIONATE AND SALMETEROL XINAFOATE 115; 21 UG/1; UG/1
AEROSOL, METERED RESPIRATORY (INHALATION)
Qty: 12 G | Refills: 3 | Status: SHIPPED | OUTPATIENT
Start: 2020-07-07 | End: 2020-11-27

## 2020-07-07 RX ORDER — ALBUTEROL SULFATE 90 UG/1
AEROSOL, METERED RESPIRATORY (INHALATION)
Qty: 18 G | Refills: 0 | Status: SHIPPED | OUTPATIENT
Start: 2020-07-07 | End: 2020-09-21

## 2020-09-19 DIAGNOSIS — J45.21 MILD INTERMITTENT ASTHMA WITH EXACERBATION: ICD-10-CM

## 2020-09-21 RX ORDER — ALBUTEROL SULFATE 90 UG/1
AEROSOL, METERED RESPIRATORY (INHALATION)
Qty: 1 INHALER | Refills: 0 | Status: SHIPPED | OUTPATIENT
Start: 2020-09-21 | End: 2020-11-27

## 2020-09-26 ENCOUNTER — HOSPITAL ENCOUNTER (EMERGENCY)
Facility: CLINIC | Age: 30
Discharge: HOME | End: 2020-09-26
Attending: NURSE PRACTITIONER | Admitting: NURSE PRACTITIONER
Payer: COMMERCIAL

## 2020-09-26 VITALS
WEIGHT: 315 LBS | RESPIRATION RATE: 20 BRPM | OXYGEN SATURATION: 97 % | HEART RATE: 90 BPM | TEMPERATURE: 97.4 F | SYSTOLIC BLOOD PRESSURE: 157 MMHG | BODY MASS INDEX: 58.44 KG/M2 | DIASTOLIC BLOOD PRESSURE: 92 MMHG

## 2020-09-26 DIAGNOSIS — S30.811A ABRASION OF ABDOMINAL WALL, INITIAL ENCOUNTER: ICD-10-CM

## 2020-09-26 PROCEDURE — 99213 OFFICE O/P EST LOW 20 MIN: CPT | Mod: Z6 | Performed by: NURSE PRACTITIONER

## 2020-09-26 PROCEDURE — G0463 HOSPITAL OUTPT CLINIC VISIT: HCPCS | Performed by: NURSE PRACTITIONER

## 2020-09-26 NOTE — ED AVS SNAPSHOT
[facility] Emergency Department  [address] [address]  Phone:  [phone]  Fax:  [phone]                                    Rafael Duran   MRN: [id number]    Department:  [facility] Emergency Department   Date of Visit:  9/26/2020           After Visit Summary Signature Page    I have received my discharge instructions, and my questions have been answered. I have discussed any challenges I see with this plan with the nurse or doctor.    ..........................................................................................................................................  Patient/Patient Representative Signature      ..........................................................................................................................................  Patient Representative Print Name and Relationship to Patient    ..................................................               ................................................  Date                                   Time    ..........................................................................................................................................  Reviewed by Signature/Title    ...................................................              ..............................................  Date                                               Time          22EPIC Rev 08/18

## 2020-09-26 NOTE — ED PROVIDER NOTES
History     Chief Complaint   Patient presents with     bleeding from belly button     HPI  Rafael Duran is a 30 year old male with past medical history of morbid obesity, intermittent asthma presenting to the emergency department/urgent care with concern of possible umbilical bleeding.  Patient states around 1030 this morning he noted some blood around his bellybutton.  He thought he should get evaluated.  Patient states it was very small amount and he does not recall injury or irritation.  Patient states he may have been itching it but does not recall this.   Patient denies fever, aches, chills, sweats, ear pain, eye pain, throat pain.  Patient denies umbilical and abdominal pain.  Patient denies history of similar problems.  Patient denies history of MRSA.    Allergies:  No Known Allergies    Problem List:    Patient Active Problem List    Diagnosis Date Noted     Morbid obesity (H) 02/04/2020     Priority: Medium     Bilateral low back pain without sciatica 01/27/2016     Priority: Medium     Nicotine abuse 02/25/2013     Priority: Medium     Intermittent asthma 02/01/2012     Priority: Medium     PFT 1/25/12 results:FVC=92%, FEV1=80%, FEV1/FVC=86%.  INTERPRETATION: Mild obstructive lung disease. Reversibility with bronchodilators is seen on testing today. Lung volumes are within normal limits. DLCO is increased. An increase in DLCO can be seen in the setting of erythrocytosis, left-to-right heart shunt, acute asthma, and occasionally in congestive heart failure.          CARDIOVASCULAR SCREENING; LDL GOAL LESS THAN 130 12/01/2011     Priority: Medium     Obesity 11/25/2011     Priority: Medium        Past Medical History:    Past Medical History:   Diagnosis Date     Asthma        Past Surgical History:    Past Surgical History:   Procedure Laterality Date     NONE         Family History:    Family History   Problem Relation Age of Onset     Hypertension Paternal Grandfather      Asthma Paternal  Grandfather      Asthma Father      Asthma Paternal Grandmother      Respiratory Paternal Grandmother         SHARLENE     Asthma Paternal Uncle      Asthma Paternal Aunt      Diabetes No family hx of      Cancer - colorectal No family hx of      C.A.D. No family hx of      Cerebrovascular Disease No family hx of        Social History:  Marital Status:  Single [1]  Social History     Tobacco Use     Smoking status: Former Smoker     Packs/day: 0.00     Years: 8.00     Pack years: 0.00     Types: Cigarettes     Smokeless tobacco: Never Used     Tobacco comment: 5-6 ciggarettes per day as of 7/16/2014 . Stopped smoking 1 year ago.11/2/2019   Substance Use Topics     Alcohol use: Yes     Comment: Rare- once a month      Drug use: No        Medications:    ADVAIR -21 MCG/ACT inhaler  albuterol (PROVENTIL) (2.5 MG/3ML) 0.083% neb solution  albuterol (VENTOLIN HFA) 108 (90 Base) MCG/ACT inhaler      Review of Systems  As mentioned above in the history present illness. All other systems were reviewed and are negative.    Physical Exam   BP: (!) 157/92  Pulse: 90  Temp: 97.4  F (36.3  C)  Resp: 20  Weight: (!) 176.9 kg (390 lb)  SpO2: 97 %      Physical Exam  Vitals signs and nursing note reviewed.   Constitutional:       General: He is not in acute distress.     Appearance: He is well-developed. He is obese. He is not diaphoretic.   HENT:      Head: Normocephalic and atraumatic.      Right Ear: External ear normal.      Left Ear: External ear normal.      Nose: Nose normal.   Eyes:      General:         Right eye: No discharge.         Left eye: No discharge.      Conjunctiva/sclera: Conjunctivae normal.   Cardiovascular:      Rate and Rhythm: Normal rate.      Heart sounds: No murmur.   Pulmonary:      Effort: Pulmonary effort is normal.   Abdominal:      Palpations: Abdomen is soft.      Tenderness: There is no abdominal tenderness.       Skin:     General: Skin is warm.      Findings: No rash.   Neurological:       Mental Status: He is alert and oriented to person, place, and time.         ED Course        Procedures    No results found for this or any previous visit (from the past 24 hour(s)).    Medications - No data to display    Assessments & Plan (with Medical Decision Making)  Rafael Duran is a 30 year old male with past medical history of morbid obesity, intermittent asthma presenting to the emergency department/urgent care with concern of possible umbilical bleeding.  Patient states around 1030 this morning he noted some blood around his bellybutton.  He thought he should get evaluated.  Patient states it was very small amount and he does not recall injury or irritation.  Patient states he may have been itching it but does not recall this.  On exam patient has 3 faint abrasion marks on the outer umbilicus region between 5 and 7:00 without active bleeding.  Patient's umbilicus was cleaned with water and Hibiclens.  No active bleeding noted.  Discussed with patient abrasion and wound cleansing and umbilical care.  Recommend cleaning twice daily and patting dry.  Recommend avoiding ointment.  Discussed worrisome reasons with which to return.  No signs of omphalitis or cellulitis or indication for antibiotics presently.     I have reviewed the nursing notes.    I have reviewed the findings, diagnosis, plan and need for follow up with the patient.    Discharge Medication List as of 9/26/2020 12:24 PM          Final diagnoses:   Abrasion of abdominal wall, initial encounter - umbilicus       9/26/2020   [facility] EMERGENCY DEPARTMENT     Madeline Parsons, HOLLY CNP  09/26/20 0247

## 2020-09-28 ENCOUNTER — TELEPHONE (OUTPATIENT)
Dept: FAMILY MEDICINE | Facility: CLINIC | Age: 30
End: 2020-09-28

## 2020-09-28 NOTE — TELEPHONE ENCOUNTER
Panel Management Review      Patient has the following on his problem list:     Asthma review     ACT Total Scores 2/4/2020   ACT TOTAL SCORE -   ASTHMA ER VISITS -   ASTHMA HOSPITALIZATIONS -   ACT TOTAL SCORE (Goal Greater than or Equal to 20) 16   In the past 12 months, how many times did you visit the emergency room for your asthma without being admitted to the hospital? 0   In the past 12 months, how many times were you hospitalized overnight because of your asthma? 0      1. Is Asthma diagnosis on the Problem List? Yes    2. Is Asthma listed on Health Maintenance? Yes    3. Patient is due for:  ACT and AAP      Composite cancer screening  Chart review shows that this patient is due/due soon for the following None  Summary:    Patient is due/failing the following:   AAP and ACT    Action needed:   Patient needs to do ACT.    Type of outreach:    Copy of ACT mailed to patient, will reach out in 5 days.    Questions for provider review:    None                                                                                                                                    Nicole Caro MA      Chart routed to Care Team .

## 2020-09-28 NOTE — LETTER
My Asthma Action Plan    Name: Rafael Duran   YOB: 1990  Date: 9/28/2020   My doctor: HOLLY Madsen CNP   My clinic: [facility]        My Rescue Medicine:   Albuterol inhaler (Proair/Ventolin/Proventil HFA)  2-4 puffs EVERY 4 HOURS as needed. Use a spacer if recommended by your provider.   My Asthma Severity:   Intermittent / Exercise Induced  Know your asthma triggers: Patient is unaware of triggers  None          GREEN ZONE   Good Control    I feel good    No cough or wheeze    Can work, sleep and play without asthma symptoms       Take your asthma control medicine every day.     1. If exercise triggers your asthma, take your rescue medication    15 minutes before exercise or sports, and    During exercise if you have asthma symptoms  2. Spacer to use with inhaler: If you have a spacer, make sure to use it with your inhaler             YELLOW ZONE Getting Worse  I have ANY of these:    I do not feel good    Cough or wheeze    Chest feels tight    Wake up at night   1. Keep taking your Green Zone medications  2. Start taking your rescue medicine:    every 20 minutes for up to 1 hour. Then every 4 hours for 24-48 hours.  3. If you stay in the Yellow Zone for more than 12-24 hours, contact your doctor.  4. If you do not return to the Green Zone in 12-24 hours or you get worse, start taking your oral steroid medicine if prescribed by your provider.           RED ZONE Medical Alert - Get Help  I have ANY of these:    I feel awful    Medicine is not helping    Breathing getting harder    Trouble walking or talking    Nose opens wide to breathe       1. Take your rescue medicine NOW  2. If your provider has prescribed an oral steroid medicine, start taking it NOW  3. Call your doctor NOW  4. If you are still in the Red Zone after 20 minutes and you have not reached your doctor:    Take your rescue medicine again and    Call 911 or go to the emergency room right away    See  your regular doctor within 2 weeks of an Emergency Room or Urgent Care visit for follow-up treatment.          Annual Reminders:  Meet with Asthma Educator,  Flu Shot in the Fall, consider Pneumonia Vaccination for patients with asthma (aged 19 and older).    Pharmacy:    [facility] - [place], [place] - [address] [facility] - [place], [place] - [address] [facility], [place] - [address] [facility] - [place], [place] - [address] [place] [address]    Electronically signed by HOLLY Madsen CNP   Date: 09/28/20                    Asthma Triggers  How To Control Things That Make Your Asthma Worse    Triggers are things that make your asthma worse.  Look at the list below to help you find your triggers and   what you can do about them. You can help prevent asthma flare-ups by staying away from your triggers.      Trigger                                                          What you can do   Cigarette Smoke  Tobacco smoke can make asthma worse. Do not allow smoking in your home, car or around you.  Be sure no one smokes at a child s day care or school.  If you smoke, ask your health care provider for ways to help you quit.  Ask family members to quit too.  Ask your health care provider for a referral to Quit Plan to help you quit smoking, or call 3-260-839-PLAN.     Colds, Flu, Bronchitis  These are common triggers of asthma. Wash your hands often.  Don t touch your eyes, nose or mouth.  Get a flu shot every year.     Dust Mites  These are tiny bugs that live in cloth or carpet. They are too small to see. Wash sheets and blankets in hot water every week.   Encase pillows and mattress in dust mite proof covers.  Avoid having carpet if you can. If you have carpet, vacuum weekly.   Use a dust mask and HEPA vacuum.   Pollen and Outdoor Mold  Some people are allergic to trees, grass, or weed pollen, or molds. Try to keep your windows closed.  Limit time  out doors when pollen count is high.   Ask you health care provider about taking medicine during allergy season.     Animal Dander  Some people are allergic to skin flakes, urine or saliva from pets with fur or feathers. Keep pets with fur or feathers out of your home.    If you can t keep the pet outdoors, then keep the pet out of your bedroom.  Keep the bedroom door closed.  Keep pets off cloth furniture and away from stuffed toys.     Mice, Rats, and Cockroaches  Some people are allergic to the waste from these pests.   Cover food and garbage.  Clean up spills and food crumbs.  Store grease in the refrigerator.   Keep food out of the bedroom.   Indoor Mold  This can be a trigger if your home has high moisture. Fix leaking faucets, pipes, or other sources of water.   Clean moldy surfaces.  Dehumidify basement if it is damp and smelly.   Smoke, Strong Odors, and Sprays  These can reduce air quality. Stay away from strong odors and sprays, such as perfume, powder, hair spray, paints, smoke incense, paint, cleaning products, candles and new carpet.   Exercise or Sports  Some people with asthma have this trigger. Be active!  Ask your doctor about taking medicine before sports or exercise to prevent symptoms.    Warm up for 5-10 minutes before and after sports or exercise.     Other Triggers of Asthma  Cold air:  Cover your nose and mouth with a scarf.  Sometimes laughing or crying can be a trigger.  Some medicines and food can trigger asthma.

## 2020-09-28 NOTE — LETTER
[facility]  [address] [place] [id number]  [phone]      September 28, 2020    Rafael Duran                                                                                                                     [address]  [place] [address]            Dear Rafael,    At [facility] we care about your health and well-being. A review of your chart has indicated that you are due for an Asthma Control Test. For copyright reasons we have attached a copy of the questionnaire. Please complete the questions and mail them back to the clinic in the provided envelope.    You may contact the clinic at [phone] if you have any questions or concerns about this request.       Sincerely,       [facility] Care Staff/ ss

## 2020-10-19 NOTE — TELEPHONE ENCOUNTER
Called and left message for patient to call clinic back. Needs to complete an ACT.    Nicole Caro MA

## 2020-11-24 DIAGNOSIS — J45.21 MILD INTERMITTENT ASTHMA WITH EXACERBATION: ICD-10-CM

## 2020-11-27 RX ORDER — FLUTICASONE PROPIONATE AND SALMETEROL XINAFOATE 115; 21 UG/1; UG/1
AEROSOL, METERED RESPIRATORY (INHALATION)
Qty: 1 INHALER | Refills: 0 | Status: SHIPPED | OUTPATIENT
Start: 2020-11-27 | End: 2022-07-28

## 2020-11-27 RX ORDER — ALBUTEROL SULFATE 90 UG/1
AEROSOL, METERED RESPIRATORY (INHALATION)
Qty: 1 INHALER | Refills: 0 | Status: SHIPPED | OUTPATIENT
Start: 2020-11-27 | End: 2022-07-28

## 2020-11-27 RX ORDER — ALBUTEROL SULFATE 0.83 MG/ML
SOLUTION RESPIRATORY (INHALATION)
Qty: 1 BOX | Refills: 0 | Status: SHIPPED | OUTPATIENT
Start: 2020-11-27 | End: 2022-12-08

## 2020-12-14 ENCOUNTER — TELEPHONE (OUTPATIENT)
Dept: FAMILY MEDICINE | Facility: CLINIC | Age: 30
End: 2020-12-14

## 2020-12-14 NOTE — TELEPHONE ENCOUNTER
Panel Management Review      Patient has the following on his problem list:     Asthma review     ACT Total Scores 2/4/2020   ACT TOTAL SCORE -   ASTHMA ER VISITS -   ASTHMA HOSPITALIZATIONS -   ACT TOTAL SCORE (Goal Greater than or Equal to 20) 16   In the past 12 months, how many times did you visit the emergency room for your asthma without being admitted to the hospital? 0   In the past 12 months, how many times were you hospitalized overnight because of your asthma? 0      1. Is Asthma diagnosis on the Problem List? Yes    2. Is Asthma listed on Health Maintenance? Yes    3. Patient is due for:  ACT      Composite cancer screening  Chart review shows that this patient is due/due soon for the following None  Summary:    Patient is due/failing the following:   ACT    Action needed:   Patient needs to do ACT.    Type of outreach:    Copy of ACT mailed to patient, will reach out in 5 days.    Questions for provider review:    None                                                                                                                                    Nicole Caro MA      Chart routed to Care Team .

## 2020-12-14 NOTE — LETTER
[facility]  [address] [place] [id number]  [phone]      December 14, 2020    Rafael Duran                                                                                                                     [address]  [facility] [address]            Dear Rafael,      At [facility] we care about your health and well-being. A review of your chart has indicated that you are due for an Asthma Control Test. For copyright reasons we have attached a copy of the questionnaire. Please complete the questions and mail them back to the clinic in the provided envelope.    You may contact the clinic at [phone] if you have any questions or concerns about this request.       Sincerely,     [facility] Care Staff/ ss

## 2021-12-16 ENCOUNTER — OFFICE VISIT (OUTPATIENT)
Dept: URGENT CARE | Facility: URGENT CARE | Age: 31
End: 2021-12-16
Payer: COMMERCIAL

## 2021-12-16 VITALS
HEART RATE: 84 BPM | DIASTOLIC BLOOD PRESSURE: 88 MMHG | WEIGHT: 315 LBS | TEMPERATURE: 98.2 F | OXYGEN SATURATION: 97 % | SYSTOLIC BLOOD PRESSURE: 144 MMHG | BODY MASS INDEX: 57.54 KG/M2

## 2021-12-16 DIAGNOSIS — E66.01 MORBID OBESITY (H): ICD-10-CM

## 2021-12-16 DIAGNOSIS — R05.9 COUGH: Primary | ICD-10-CM

## 2021-12-16 DIAGNOSIS — J20.9 ACUTE BRONCHITIS WITH SYMPTOMS > 10 DAYS: ICD-10-CM

## 2021-12-16 DIAGNOSIS — J45.21 MILD INTERMITTENT ASTHMA WITH EXACERBATION: ICD-10-CM

## 2021-12-16 DIAGNOSIS — Z76.0 ENCOUNTER FOR MEDICATION REFILL: ICD-10-CM

## 2021-12-16 LAB
FLUAV AG SPEC QL IA: NEGATIVE
FLUBV AG SPEC QL IA: NEGATIVE

## 2021-12-16 PROCEDURE — 87804 INFLUENZA ASSAY W/OPTIC: CPT | Performed by: PHYSICIAN ASSISTANT

## 2021-12-16 PROCEDURE — 99214 OFFICE O/P EST MOD 30 MIN: CPT | Performed by: PHYSICIAN ASSISTANT

## 2021-12-16 PROCEDURE — U0003 INFECTIOUS AGENT DETECTION BY NUCLEIC ACID (DNA OR RNA); SEVERE ACUTE RESPIRATORY SYNDROME CORONAVIRUS 2 (SARS-COV-2) (CORONAVIRUS DISEASE [COVID-19]), AMPLIFIED PROBE TECHNIQUE, MAKING USE OF HIGH THROUGHPUT TECHNOLOGIES AS DESCRIBED BY CMS-2020-01-R: HCPCS | Performed by: PHYSICIAN ASSISTANT

## 2021-12-16 PROCEDURE — U0005 INFEC AGEN DETEC AMPLI PROBE: HCPCS | Performed by: PHYSICIAN ASSISTANT

## 2021-12-16 RX ORDER — ALBUTEROL SULFATE 0.83 MG/ML
SOLUTION RESPIRATORY (INHALATION)
Status: CANCELLED | OUTPATIENT
Start: 2021-12-16

## 2021-12-16 RX ORDER — ALBUTEROL SULFATE 90 UG/1
2 AEROSOL, METERED RESPIRATORY (INHALATION) EVERY 6 HOURS
Qty: 18 G | Refills: 0 | Status: SHIPPED | OUTPATIENT
Start: 2021-12-16 | End: 2022-07-28

## 2021-12-16 RX ORDER — AZITHROMYCIN 250 MG/1
TABLET, FILM COATED ORAL
Qty: 6 TABLET | Refills: 0 | Status: SHIPPED | OUTPATIENT
Start: 2021-12-16 | End: 2021-12-21

## 2021-12-16 RX ORDER — PREDNISONE 20 MG/1
40 TABLET ORAL DAILY
Qty: 10 TABLET | Refills: 0 | Status: SHIPPED | OUTPATIENT
Start: 2021-12-16 | End: 2021-12-21

## 2021-12-16 RX ORDER — ALBUTEROL SULFATE 0.83 MG/ML
2.5 SOLUTION RESPIRATORY (INHALATION) EVERY 6 HOURS PRN
Qty: 90 ML | Refills: 0 | Status: SHIPPED | OUTPATIENT
Start: 2021-12-16 | End: 2022-07-28

## 2021-12-16 ASSESSMENT — ENCOUNTER SYMPTOMS
NAUSEA: 0
HEADACHES: 0
FATIGUE: 0
APPETITE CHANGE: 0
MYALGIAS: 0
CHILLS: 0
VOMITING: 0
FEVER: 0
SORE THROAT: 0
SHORTNESS OF BREATH: 1
DIARRHEA: 0
ABDOMINAL PAIN: 0
DIAPHORESIS: 0
COUGH: 1

## 2021-12-16 ASSESSMENT — PAIN SCALES - GENERAL: PAINLEVEL: NO PAIN (0)

## 2021-12-16 NOTE — PROGRESS NOTES
SUBJECTIVE:   Rafael Duran is a 31 year old male presenting with a chief complaint of   Chief Complaint   Patient presents with     Cough     cough and congestion 1.5 weeks        He is an established patient of [facility].  Patient presents with productive cough and SOB x 10 days.  Patient is asthmatic.  No previous hospitalization or intubation for asthma.   COVID vaccine and booster.          Review of Systems   Constitutional: Negative for appetite change, chills, diaphoresis, fatigue and fever.   HENT: Negative for ear pain and sore throat.    Respiratory: Positive for cough and shortness of breath.    Cardiovascular: Negative for chest pain.   Gastrointestinal: Negative for abdominal pain, diarrhea, nausea and vomiting.   Musculoskeletal: Negative for myalgias.   Skin: Negative for rash.   Neurological: Negative for headaches.   All other systems reviewed and are negative.      Past Medical History:   Diagnosis Date     Asthma      Family History   Problem Relation Age of Onset     Hypertension Paternal Grandfather      Asthma Paternal Grandfather      Asthma Father      Asthma Paternal Grandmother      Respiratory Paternal Grandmother         SHARLENE     Asthma Paternal Uncle      Asthma Paternal Aunt      Diabetes No family hx of      Cancer - colorectal No family hx of      C.A.D. No family hx of      Cerebrovascular Disease No family hx of      Current Outpatient Medications   Medication Sig Dispense Refill     ADVAIR -21 MCG/ACT inhaler INHALE TWO PUFFS BY MOUTH TWICE A DAY 1 Inhaler 0     albuterol (PROAIR HFA/PROVENTIL HFA/VENTOLIN HFA) 108 (90 Base) MCG/ACT inhaler Inhale 2 puffs into the lungs every 6 hours 18 g 0     albuterol (PROVENTIL) (2.5 MG/3ML) 0.083% neb solution Take 1 vial (2.5 mg) by nebulization every 6 hours as needed for shortness of breath / dyspnea or wheezing 90 mL 0     albuterol (PROVENTIL) (2.5 MG/3ML) 0.083% neb solution NEBULIZE CONTENTS OF ONE VIAL EVERY 4 HOURS AS NEEDED  FOR SHORTNESS OF BREATH /DYSPNEA OR WHEEZING 1 Box 0     albuterol (VENTOLIN HFA) 108 (90 Base) MCG/ACT inhaler INHALE TWO PUFFS BY MOUTH EVERY 4-6 HOURS AS NEEDED FOR COUGH, WHEEZING OR FOR SHORTNESS OF BREATH 1 Inhaler 0     azithromycin (ZITHROMAX) 250 MG tablet Take 2 tablets (500 mg) by mouth daily for 1 day, THEN 1 tablet (250 mg) daily for 4 days. 6 tablet 0     predniSONE (DELTASONE) 20 MG tablet Take 2 tablets (40 mg) by mouth daily for 5 days 10 tablet 0     Social History     Tobacco Use     Smoking status: Former Smoker     Packs/day: 0.00     Years: 8.00     Pack years: 0.00     Types: Cigarettes     Smokeless tobacco: Never Used     Tobacco comment: 5-6 ciggarettes per day as of 7/16/2014 . Stopped smoking 1 year ago.11/2/2019   Substance Use Topics     Alcohol use: Yes     Comment: Rare- once a month        OBJECTIVE  BP (!) 144/88   Pulse 84   Temp 98.2  F (36.8  C) (Tympanic)   Wt (!) 174.2 kg (384 lb)   SpO2 97%   BMI 57.54 kg/m      Physical Exam  Vitals and nursing note reviewed.   Constitutional:       Appearance: Normal appearance. He is obese.   HENT:      Head: Normocephalic and atraumatic.      Right Ear: Tympanic membrane, ear canal and external ear normal.      Left Ear: Tympanic membrane, ear canal and external ear normal.      Nose: Nose normal.      Mouth/Throat:      Mouth: Mucous membranes are moist.      Pharynx: Oropharynx is clear.   Eyes:      Extraocular Movements: Extraocular movements intact.      Conjunctiva/sclera: Conjunctivae normal.   Cardiovascular:      Rate and Rhythm: Normal rate and regular rhythm.      Pulses: Normal pulses.      Heart sounds: Normal heart sounds.   Pulmonary:      Effort: Pulmonary effort is normal.      Breath sounds: Wheezing and rhonchi present.   Musculoskeletal:      Cervical back: Normal range of motion and neck supple. No rigidity. No muscular tenderness.   Skin:     General: Skin is warm and dry.   Neurological:      General: No focal  deficit present.      Mental Status: He is alert.   Psychiatric:         Mood and Affect: Mood normal.         Behavior: Behavior normal.         Labs:  No results found for this or any previous visit (from the past 24 hour(s)).    X-Ray was not done.    ASSESSMENT:      ICD-10-CM    1. Cough  R05.9 Symptomatic; Yes COVID-19 Virus (Coronavirus) by PCR     Influenza A/B antigen   2. Mild intermittent asthma with exacerbation  J45.21 albuterol (PROVENTIL) (2.5 MG/3ML) 0.083% neb solution     predniSONE (DELTASONE) 20 MG tablet     albuterol (PROAIR HFA/PROVENTIL HFA/VENTOLIN HFA) 108 (90 Base) MCG/ACT inhaler   3. Acute bronchitis with symptoms > 10 days  J20.9 azithromycin (ZITHROMAX) 250 MG tablet   4. Morbid obesity (H)  E66.01    5. Encounter for medication refill  Z76.0         Medical Decision Making:    Differential Diagnosis:  Hypoventilation syndrome, asthma exacerbation, bronchitis    Serious Comorbid Conditions:  Adult:  Asthma    PLAN:    Refill on albuterol nebulizer and inhaler.  Rx for prednisone and zpak.  Discussed and recommended CDC guidelines for self isolation.  COVID test pending.    Will call patient if influenza is positive.  Patient lives with other people.  Discussed reasons to seek immediate medical attention.  Additionally if no improvement or worsening in one week, may follow up with PCP and/or UC.  Briefly discussed that weight reduction would benefit patient and is a risk factor for COVID.  Discussed SE of prednisone and expectations.        Followup:    If not improving or if condition worsens, follow up with your Primary Care Provider, If not improving or if conditions worsens over the next 12-24 hours, go to the Emergency Department    Patient Instructions     Patient Education     Acute Bronchitis  Your healthcare provider has told you that you have acute bronchitis. Bronchitis is infection or inflammation of the airways in the lungs (bronchial tubes). Normally, air moves easily in  and out of the airways. Bronchitis narrows the airways. This makes it harder for air to flow in and out of the lungs. This causes symptoms such as shortness of breath, coughing up yellow or green mucus, and wheezing.  Bronchitis can be acute or chronic. Acute means it happens quickly and goes away in a short time. Chronic means a condition lasts a long time and often comes back. Most people with acute bronchitis get better in 1 to 2 weeks.     What causes acute bronchitis?  Acute bronchitis is often caused by a virus such as a cold or the flu. In some cases, it may be caused by bacteria. Certain factors make it more likely for a cold or flu to turn into bronchitis. These include being very young, being elderly, having a heart or lung problem, or having a weak immune system. Cigarette smoking also makes bronchitis more likely.  When bronchitis develops, the airways become swollen. The airways may also become infected with bacteria. This is known as a secondary infection.  Symptoms of acute bronchitis  Symptoms can include:    Coughing with mucus    Wheezing    Feeling short of breath    Chest pain    Fever  Diagnosing acute bronchitis  Your healthcare provider will ask about your symptoms and health history. He or she will give you a physical exam. This will include listening to your lungs while you breathe. You may have a chest X-ray to look for infection in the lungs (pneumonia) if you have had a fever. You may also have blood tests to check for infection.  Treating acute bronchitis  Bronchitis usually goes away in 1 to 2 weeks without treatment. You can help feel better by:    Taking medicine as directed. Talk to your healthcare provider before taking any over-the-counter medicines (OTC). Some OTC medicines help relieve inflammation in your bronchial tubes. They can also thin mucus. This makes it easier to cough up. Your healthcare provider may prescribe an inhaler to help open up the bronchial tubes. Most of the  time, acute bronchitis is caused by a viral infection. Antibiotics are usually not prescribed for viral infections.    Drinking plenty of fluids, such as water, juice, or warm soup. Fluids loosen mucus so that you can cough it up. This helps you breathe more easily. Fluids also prevent dehydration.    Using a humidifier. This can help reduce coughing.    Getting plenty of rest    Not smoking. Also, don't let anyone else smoke in your home. In public places, move away from secondhand smoke.  Recovery and follow-up  Follow up with your doctor. You will likely feel better in 1 to 2 weeks. But you may have a dry cough for a longer time. Let your doctor know if you still have symptoms other than a dry cough after 2 weeks. Tell him or her if you get bronchial infections often.  Self-care tips  To get relief from your symptoms and prevent bronchitis:    Stop smoking. Stopping smoking is the most important step you can take to treat bronchitis. If you need help stopping smoking, talk with your healthcare provider.    Stay away from secondhand smoke and other irritants. Try to stay away from smoke, chemicals, fumes, and dust. Don t let anyone smoke in your home. Stay indoors on smoggy days.    Prevent lung infections. Ask your healthcare provider about the flu and pneumonia vaccines. Take steps to prevent colds and other lung infections.    Wash your hands well. Wash your hands often with soap and water. Use hand  when you can t wash your hands. Stay away from crowds during cold and flu season.  When to call your healthcare provider  Call the healthcare provider if you have any of these:    Fever of 100.4 F ( 38.0 C) or higher, or as directed by your healthcare provider    Symptoms that get worse, or new symptoms    Breathing not getting better with treatments    Symptoms that don t start to get better in 1 week  [employer] last reviewed this educational content on 8/1/2019 2000-2021 [employer]. All  rights reserved. This information is not intended as a substitute for professional medical care. Always follow your healthcare professional's instructions.

## 2021-12-16 NOTE — PATIENT INSTRUCTIONS
Patient Education     Acute Bronchitis  Your healthcare provider has told you that you have acute bronchitis. Bronchitis is infection or inflammation of the airways in the lungs (bronchial tubes). Normally, air moves easily in and out of the airways. Bronchitis narrows the airways. This makes it harder for air to flow in and out of the lungs. This causes symptoms such as shortness of breath, coughing up yellow or green mucus, and wheezing.  Bronchitis can be acute or chronic. Acute means it happens quickly and goes away in a short time. Chronic means a condition lasts a long time and often comes back. Most people with acute bronchitis get better in 1 to 2 weeks.     What causes acute bronchitis?  Acute bronchitis is often caused by a virus such as a cold or the flu. In some cases, it may be caused by bacteria. Certain factors make it more likely for a cold or flu to turn into bronchitis. These include being very young, being elderly, having a heart or lung problem, or having a weak immune system. Cigarette smoking also makes bronchitis more likely.  When bronchitis develops, the airways become swollen. The airways may also become infected with bacteria. This is known as a secondary infection.  Symptoms of acute bronchitis  Symptoms can include:    Coughing with mucus    Wheezing    Feeling short of breath    Chest pain    Fever  Diagnosing acute bronchitis  Your healthcare provider will ask about your symptoms and health history. He or she will give you a physical exam. This will include listening to your lungs while you breathe. You may have a chest X-ray to look for infection in the lungs (pneumonia) if you have had a fever. You may also have blood tests to check for infection.  Treating acute bronchitis  Bronchitis usually goes away in 1 to 2 weeks without treatment. You can help feel better by:    Taking medicine as directed. Talk to your healthcare provider before taking any over-the-counter medicines (OTC).  Some OTC medicines help relieve inflammation in your bronchial tubes. They can also thin mucus. This makes it easier to cough up. Your healthcare provider may prescribe an inhaler to help open up the bronchial tubes. Most of the time, acute bronchitis is caused by a viral infection. Antibiotics are usually not prescribed for viral infections.    Drinking plenty of fluids, such as water, juice, or warm soup. Fluids loosen mucus so that you can cough it up. This helps you breathe more easily. Fluids also prevent dehydration.    Using a humidifier. This can help reduce coughing.    Getting plenty of rest    Not smoking. Also, don't let anyone else smoke in your home. In public places, move away from secondhand smoke.  Recovery and follow-up  Follow up with your doctor. You will likely feel better in 1 to 2 weeks. But you may have a dry cough for a longer time. Let your doctor know if you still have symptoms other than a dry cough after 2 weeks. Tell him or her if you get bronchial infections often.  Self-care tips  To get relief from your symptoms and prevent bronchitis:    Stop smoking. Stopping smoking is the most important step you can take to treat bronchitis. If you need help stopping smoking, talk with your healthcare provider.    Stay away from secondhand smoke and other irritants. Try to stay away from smoke, chemicals, fumes, and dust. Don t let anyone smoke in your home. Stay indoors on smoggy days.    Prevent lung infections. Ask your healthcare provider about the flu and pneumonia vaccines. Take steps to prevent colds and other lung infections.    Wash your hands well. Wash your hands often with soap and water. Use hand  when you can t wash your hands. Stay away from crowds during cold and flu season.  When to call your healthcare provider  Call the healthcare provider if you have any of these:    Fever of 100.4 F ( 38.0 C) or higher, or as directed by your healthcare provider    Symptoms that get  worse, or new symptoms    Breathing not getting better with treatments    Symptoms that don t start to get better in 1 week  [employer] last reviewed this educational content on 8/1/2019 2000-2021 [employer]. All rights reserved. This information is not intended as a substitute for professional medical care. Always follow your healthcare professional's instructions.

## 2021-12-17 LAB — SARS-COV-2 RNA RESP QL NAA+PROBE: NEGATIVE

## 2022-07-28 ENCOUNTER — OFFICE VISIT (OUTPATIENT)
Dept: FAMILY MEDICINE | Facility: CLINIC | Age: 32
End: 2022-07-28
Payer: COMMERCIAL

## 2022-07-28 VITALS
RESPIRATION RATE: 18 BRPM | TEMPERATURE: 98.9 F | WEIGHT: 315 LBS | HEART RATE: 84 BPM | HEIGHT: 69 IN | BODY MASS INDEX: 46.65 KG/M2 | OXYGEN SATURATION: 97 % | DIASTOLIC BLOOD PRESSURE: 80 MMHG | SYSTOLIC BLOOD PRESSURE: 136 MMHG

## 2022-07-28 DIAGNOSIS — J45.21 MILD INTERMITTENT ASTHMA WITH EXACERBATION: ICD-10-CM

## 2022-07-28 DIAGNOSIS — E66.01 MORBID OBESITY (H): ICD-10-CM

## 2022-07-28 DIAGNOSIS — Z00.00 ROUTINE GENERAL MEDICAL EXAMINATION AT A HEALTH CARE FACILITY: Primary | ICD-10-CM

## 2022-07-28 PROCEDURE — 99395 PREV VISIT EST AGE 18-39: CPT | Performed by: NURSE PRACTITIONER

## 2022-07-28 PROCEDURE — 99214 OFFICE O/P EST MOD 30 MIN: CPT | Mod: 25 | Performed by: NURSE PRACTITIONER

## 2022-07-28 RX ORDER — TRIAMCINOLONE ACETONIDE 55 UG/1
2 SPRAY, METERED NASAL DAILY
COMMUNITY

## 2022-07-28 RX ORDER — ALBUTEROL SULFATE 90 UG/1
2 AEROSOL, METERED RESPIRATORY (INHALATION) EVERY 6 HOURS
Qty: 18 G | Refills: 3 | Status: SHIPPED | OUTPATIENT
Start: 2022-07-28 | End: 2022-12-08

## 2022-07-28 RX ORDER — FLUTICASONE PROPIONATE AND SALMETEROL XINAFOATE 115; 21 UG/1; UG/1
AEROSOL, METERED RESPIRATORY (INHALATION)
Qty: 12 G | Refills: 3 | Status: SHIPPED | OUTPATIENT
Start: 2022-07-28 | End: 2023-01-03

## 2022-07-28 RX ORDER — FLUTICASONE PROPIONATE 50 MCG
1 SPRAY, SUSPENSION (ML) NASAL DAILY
COMMUNITY
End: 2024-08-16

## 2022-07-28 ASSESSMENT — PAIN SCALES - GENERAL: PAINLEVEL: NO PAIN (0)

## 2022-07-28 ASSESSMENT — ENCOUNTER SYMPTOMS
CONSTIPATION: 0
FEVER: 0
NAUSEA: 0
CHILLS: 0
FREQUENCY: 0
DIZZINESS: 0
MYALGIAS: 0
COUGH: 0
WEAKNESS: 0
DIARRHEA: 0
ABDOMINAL PAIN: 0
JOINT SWELLING: 0
HEARTBURN: 0
HEMATOCHEZIA: 0
DYSURIA: 0
EYE PAIN: 0
PALPITATIONS: 0
NERVOUS/ANXIOUS: 0
SHORTNESS OF BREATH: 0
HEADACHES: 0
ARTHRALGIAS: 0
SORE THROAT: 0
HEMATURIA: 0
PARESTHESIAS: 0

## 2022-07-28 NOTE — LETTER
My Asthma Action Plan    Name: Rafael Duran   YOB: 1990  Date: 7/28/2022   My doctor: HOLLY Madsen CNP   My clinic: [facility]        My Rescue Medicine:   Albuterol inhaler (Proair/Ventolin/Proventil HFA)  2-4 puffs EVERY 4 HOURS as needed. Use a spacer if recommended by your provider.   My Asthma Severity:   Intermittent / Exercise Induced  Know your asthma triggers: Patient is unaware of triggers  None          GREEN ZONE   Good Control    I feel good    No cough or wheeze    Can work, sleep and play without asthma symptoms       Take your asthma control medicine every day.     1. If exercise triggers your asthma, take your rescue medication    15 minutes before exercise or sports, and    During exercise if you have asthma symptoms  2. Spacer to use with inhaler: If you have a spacer, make sure to use it with your inhaler             YELLOW ZONE Getting Worse  I have ANY of these:    I do not feel good    Cough or wheeze    Chest feels tight    Wake up at night   1. Keep taking your Green Zone medications  2. Start taking your rescue medicine:    every 20 minutes for up to 1 hour. Then every 4 hours for 24-48 hours.  3. If you stay in the Yellow Zone for more than 12-24 hours, contact your doctor.  4. If you do not return to the Green Zone in 12-24 hours or you get worse, start taking your oral steroid medicine if prescribed by your provider.           RED ZONE Medical Alert - Get Help  I have ANY of these:    I feel awful    Medicine is not helping    Breathing getting harder    Trouble walking or talking    Nose opens wide to breathe       1. Take your rescue medicine NOW  2. If your provider has prescribed an oral steroid medicine, start taking it NOW  3. Call your doctor NOW  4. If you are still in the Red Zone after 20 minutes and you have not reached your doctor:    Take your rescue medicine again and    Call 911 or go to the emergency room right  away    See your regular doctor within 2 weeks of an Emergency Room or Urgent Care visit for follow-up treatment.          Annual Reminders:  Meet with Asthma Educator,  Flu Shot in the Fall, consider Pneumonia Vaccination for patients with asthma (aged 19 and older).    Pharmacy:    [facility] - [place], [place] - [address] [facility] - [place], [place] - [address] [facility], [place] - [address] [facility] - [place], [place] - [address] [place] [address]    Electronically signed by HOLLY Madsen CNP   Date: 07/28/22                    Asthma Triggers  How To Control Things That Make Your Asthma Worse    Triggers are things that make your asthma worse.  Look at the list below to help you find your triggers and   what you can do about them. You can help prevent asthma flare-ups by staying away from your triggers.      Trigger                                                          What you can do   Cigarette Smoke  Tobacco smoke can make asthma worse. Do not allow smoking in your home, car or around you.  Be sure no one smokes at a child s day care or school.  If you smoke, ask your health care provider for ways to help you quit.  Ask family members to quit too.  Ask your health care provider for a referral to Quit Plan to help you quit smoking, or call 1-870-502-PLAN.     Colds, Flu, Bronchitis  These are common triggers of asthma. Wash your hands often.  Don t touch your eyes, nose or mouth.  Get a flu shot every year.     Dust Mites  These are tiny bugs that live in cloth or carpet. They are too small to see. Wash sheets and blankets in hot water every week.   Encase pillows and mattress in dust mite proof covers.  Avoid having carpet if you can. If you have carpet, vacuum weekly.   Use a dust mask and HEPA vacuum.   Pollen and Outdoor Mold  Some people are allergic to trees, grass, or weed pollen, or molds. Try to keep your windows  closed.  Limit time out doors when pollen count is high.   Ask you health care provider about taking medicine during allergy season.     Animal Dander  Some people are allergic to skin flakes, urine or saliva from pets with fur or feathers. Keep pets with fur or feathers out of your home.    If you can t keep the pet outdoors, then keep the pet out of your bedroom.  Keep the bedroom door closed.  Keep pets off cloth furniture and away from stuffed toys.     Mice, Rats, and Cockroaches  Some people are allergic to the waste from these pests.   Cover food and garbage.  Clean up spills and food crumbs.  Store grease in the refrigerator.   Keep food out of the bedroom.   Indoor Mold  This can be a trigger if your home has high moisture. Fix leaking faucets, pipes, or other sources of water.   Clean moldy surfaces.  Dehumidify basement if it is damp and smelly.   Smoke, Strong Odors, and Sprays  These can reduce air quality. Stay away from strong odors and sprays, such as perfume, powder, hair spray, paints, smoke incense, paint, cleaning products, candles and new carpet.   Exercise or Sports  Some people with asthma have this trigger. Be active!  Ask your doctor about taking medicine before sports or exercise to prevent symptoms.    Warm up for 5-10 minutes before and after sports or exercise.     Other Triggers of Asthma  Cold air:  Cover your nose and mouth with a scarf.  Sometimes laughing or crying can be a trigger.  Some medicines and food can trigger asthma.

## 2022-07-28 NOTE — PROGRESS NOTES
SUBJECTIVE:   CC: Rafael Duran is an 32 year old male who presents for preventative health visit.       Patient has been advised of split billing requirements and indicates understanding: Yes  Healthy Habits:     Getting at least 3 servings of Calcium per day:  Yes    Bi-annual eye exam:  NO    Dental care twice a year:  NO    Sleep apnea or symptoms of sleep apnea:  None    Diet:  Regular (no restrictions)    Frequency of exercise:  2-3 days/week    Duration of exercise:  15-30 minutes    Taking medications regularly:  Yes    Medication side effects:  None    PHQ-2 Total Score: 0    Additional concerns today:  No          Today's PHQ-2 Score:   PHQ-2 ( 1999 [employer]) 7/28/2022   Q1: Little interest or pleasure in doing things 0   Q2: Feeling down, depressed or hopeless 0   PHQ-2 Score 0   Q1: Little interest or pleasure in doing things Not at all   Q2: Feeling down, depressed or hopeless Not at all   PHQ-2 Score 0       Abuse: Current or Past(Physical, Sexual or Emotional)- No  Do you feel safe in your environment? Yes    Have you ever done Advance Care Planning? (For example, a Health Directive, POLST, or a discussion with a medical provider or your loved ones about your wishes): No, advance care planning information given to patient to review.  Patient declined advance care planning discussion at this time.    Social History     Tobacco Use     Smoking status: Former Smoker     Packs/day: 0.00     Years: 8.00     Pack years: 0.00     Types: Cigarettes     Smokeless tobacco: Never Used     Tobacco comment: 5-6 ciggarettes per day as of 7/16/2014 . Stopped smoking 1 year ago.11/2/2019   Substance Use Topics     Alcohol use: Yes     Comment: Rare- once a month      If you drink alcohol do you typically have >3 drinks per day or >7 drinks per week? No    Alcohol Use 7/28/2022   Prescreen: >3 drinks/day or >7 drinks/week? Not Applicable       Last PSA: No results found for: PSA    Reviewed orders with patient.  Reviewed health maintenance and updated orders accordingly - Yes  BP Readings from Last 3 Encounters:   07/28/22 136/80   12/16/21 (!) 144/88   09/26/20 (!) 157/92    Wt Readings from Last 3 Encounters:   07/28/22 (!) 181 kg (399 lb)   12/16/21 (!) 174.2 kg (384 lb)   09/26/20 (!) 176.9 kg (390 lb)                  Patient Active Problem List   Diagnosis     Obesity     CARDIOVASCULAR SCREENING; LDL GOAL LESS THAN 130     Intermittent asthma     Nicotine abuse     Bilateral low back pain without sciatica     Morbid obesity (H)     Past Surgical History:   Procedure Laterality Date     NONE         Social History     Tobacco Use     Smoking status: Former Smoker     Packs/day: 0.00     Years: 8.00     Pack years: 0.00     Types: Cigarettes     Smokeless tobacco: Never Used     Tobacco comment: 5-6 ciggarettes per day as of 7/16/2014 . Stopped smoking 1 year ago.11/2/2019   Substance Use Topics     Alcohol use: Yes     Comment: Rare- once a month      Family History   Problem Relation Age of Onset     Hypertension Paternal Grandfather      Asthma Paternal Grandfather      Asthma Father      Asthma Paternal Grandmother      Respiratory Paternal Grandmother         SHARLENE     Asthma Paternal Uncle      Asthma Paternal Aunt      Diabetes No family hx of      Cancer - colorectal No family hx of      C.A.D. No family hx of      Cerebrovascular Disease No family hx of          Current Outpatient Medications   Medication Sig Dispense Refill     albuterol (PROAIR HFA/PROVENTIL HFA/VENTOLIN HFA) 108 (90 Base) MCG/ACT inhaler Inhale 2 puffs into the lungs every 6 hours 18 g 3     albuterol (PROVENTIL) (2.5 MG/3ML) 0.083% neb solution NEBULIZE CONTENTS OF ONE VIAL EVERY 4 HOURS AS NEEDED FOR SHORTNESS OF BREATH /DYSPNEA OR WHEEZING 1 Box 0     fluticasone (FLONASE) 50 MCG/ACT nasal spray Spray 1 spray into both nostrils daily       fluticasone-salmeterol (ADVAIR HFA) 115-21 MCG/ACT inhaler INHALE TWO PUFFS BY MOUTH TWICE A DAY 12 g  "3     triamcinolone (NASACORT) 55 MCG/ACT nasal aerosol Spray 2 sprays into both nostrils daily       No Known Allergies  No lab results found.     Reviewed and updated as needed this visit by clinical staff                    Reviewed and updated as needed this visit by Provider                   Past Medical History:   Diagnosis Date     Asthma       Past Surgical History:   Procedure Laterality Date     NONE       OB History   No obstetric history on file.       Review of Systems   Constitutional: Negative for chills and fever.   HENT: Negative for congestion, ear pain, hearing loss and sore throat.    Eyes: Negative for pain and visual disturbance.   Respiratory: Negative for cough and shortness of breath.    Cardiovascular: Negative for chest pain, palpitations and peripheral edema.   Gastrointestinal: Negative for abdominal pain, constipation, diarrhea, heartburn, hematochezia and nausea.   Genitourinary: Negative for dysuria, frequency, genital sores, hematuria, impotence, penile discharge and urgency.   Musculoskeletal: Negative for arthralgias, joint swelling and myalgias.   Skin: Negative for rash.   Neurological: Negative for dizziness, weakness, headaches and paresthesias.   Psychiatric/Behavioral: Negative for mood changes. The patient is not nervous/anxious.          OBJECTIVE:   /80 (BP Location: Right arm, Cuff Size: Adult Large)   Pulse 84   Temp 98.9  F (37.2  C) (Tympanic)   Resp 18   Ht 1.74 m (5' 8.5\")   Wt (!) 181 kg (399 lb)   SpO2 97%   BMI 59.79 kg/m      Physical Exam  GENERAL: healthy, alert and no distress  EYES: Eyes grossly normal to inspection, PERRL and conjunctivae and sclerae normal  HENT: ear canals and TM's normal, nose and mouth without ulcers or lesions  NECK: no adenopathy, no asymmetry, masses, or scars and thyroid normal to palpation  RESP: lungs clear to auscultation - no rales, rhonchi or wheezes  CV: regular rate and rhythm, normal S1 S2, no S3 or S4, no " "murmur, click or rub, no peripheral edema and peripheral pulses strong  ABDOMEN: soft, nontender, no hepatosplenomegaly, no masses and bowel sounds normal  MS: no gross musculoskeletal defects noted, no edema  SKIN: no suspicious lesions or rashes  NEURO: Normal strength and tone, mentation intact and speech normal  PSYCH: mentation appears normal, affect normal/bright    Diagnostic Test Results:  Labs reviewed in Epic  No results found for any visits on 07/28/22.    ASSESSMENT/PLAN:   (Z00.00) Routine general medical examination at a health care facility  (primary encounter diagnosis)  Comment:   Plan:     (J45.21) Mild intermittent asthma with exacerbation  Comment: uncontrolled patient has not been using his Advair we will renew today patient also needs a new nebulizer order written today  Plan: albuterol (PROAIR HFA/PROVENTIL HFA/VENTOLIN         HFA) 108 (90 Base) MCG/ACT inhaler,         fluticasone-salmeterol (ADVAIR HFA) 115-21         MCG/ACT inhaler, Nebulizer and Supplies Order      (E66.01) Morbid obesity (H)  Comment: *Reviewed healthy diet and exercise  Plan:     Patient has been advised of split billing requirements and indicates understanding: Yes    COUNSELING:   Reviewed preventive health counseling, as reflected in patient instructions       Regular exercise       Healthy diet/nutrition       Vision screening       Hearing screening       Alcohol Use     Estimated body mass index is 57.54 kg/m  as calculated from the following:    Height as of 2/4/20: 1.74 m (5' 8.5\").    Weight as of 12/16/21: 174.2 kg (384 lb).     Weight management plan: Discussed healthy diet and exercise guidelines    He reports that he has quit smoking. His smoking use included cigarettes. He smoked 0.00 packs per day for 8.00 years. He has never used smokeless tobacco.      Counseling Resources:  ATP IV Guidelines  Pooled Cohorts Equation Calculator  FRAX Risk Assessment  ICSI Preventive Guidelines  Dietary Guidelines for " Americans, 2010  USDA's MyPlate  ASA Prophylaxis  Lung CA Screening    [facility]  DME (Durable Medical Equipment) Orders and Documentation  Orders Placed This Encounter   Procedures     Nebulizer and Supplies Order      The patient was assessed and it was determined the patient is in need of the following listed DME Supplies/Equipment. Please complete supporting documentation below to demonstrate medical necessity.      Nebulizer Documentation  The patient was seen 07/28/2022. After assessment, the patient will need to be treated with ongoing nebulizer for treatment/management of Asthma

## 2022-11-19 ENCOUNTER — HEALTH MAINTENANCE LETTER (OUTPATIENT)
Age: 32
End: 2022-11-19

## 2022-12-08 ENCOUNTER — OFFICE VISIT (OUTPATIENT)
Dept: URGENT CARE | Facility: URGENT CARE | Age: 32
End: 2022-12-08
Payer: COMMERCIAL

## 2022-12-08 VITALS
HEART RATE: 82 BPM | DIASTOLIC BLOOD PRESSURE: 78 MMHG | OXYGEN SATURATION: 99 % | TEMPERATURE: 99.2 F | SYSTOLIC BLOOD PRESSURE: 134 MMHG

## 2022-12-08 DIAGNOSIS — R05.1 ACUTE COUGH: ICD-10-CM

## 2022-12-08 DIAGNOSIS — J45.21 MILD INTERMITTENT ASTHMA WITH EXACERBATION: ICD-10-CM

## 2022-12-08 DIAGNOSIS — R50.9 LOW GRADE FEVER: ICD-10-CM

## 2022-12-08 DIAGNOSIS — J06.9 VIRAL URI WITH COUGH: Primary | ICD-10-CM

## 2022-12-08 LAB
FLUAV AG SPEC QL IA: NEGATIVE
FLUBV AG SPEC QL IA: NEGATIVE

## 2022-12-08 PROCEDURE — 87804 INFLUENZA ASSAY W/OPTIC: CPT | Performed by: STUDENT IN AN ORGANIZED HEALTH CARE EDUCATION/TRAINING PROGRAM

## 2022-12-08 PROCEDURE — 99213 OFFICE O/P EST LOW 20 MIN: CPT | Performed by: STUDENT IN AN ORGANIZED HEALTH CARE EDUCATION/TRAINING PROGRAM

## 2022-12-08 PROCEDURE — U0003 INFECTIOUS AGENT DETECTION BY NUCLEIC ACID (DNA OR RNA); SEVERE ACUTE RESPIRATORY SYNDROME CORONAVIRUS 2 (SARS-COV-2) (CORONAVIRUS DISEASE [COVID-19]), AMPLIFIED PROBE TECHNIQUE, MAKING USE OF HIGH THROUGHPUT TECHNOLOGIES AS DESCRIBED BY CMS-2020-01-R: HCPCS | Performed by: STUDENT IN AN ORGANIZED HEALTH CARE EDUCATION/TRAINING PROGRAM

## 2022-12-08 PROCEDURE — U0005 INFEC AGEN DETEC AMPLI PROBE: HCPCS | Performed by: STUDENT IN AN ORGANIZED HEALTH CARE EDUCATION/TRAINING PROGRAM

## 2022-12-08 PROCEDURE — 87804 INFLUENZA ASSAY W/OPTIC: CPT | Mod: 59 | Performed by: STUDENT IN AN ORGANIZED HEALTH CARE EDUCATION/TRAINING PROGRAM

## 2022-12-08 RX ORDER — ALBUTEROL SULFATE 0.83 MG/ML
SOLUTION RESPIRATORY (INHALATION)
Qty: 27 ML | Refills: 3 | Status: SHIPPED | OUTPATIENT
Start: 2022-12-08 | End: 2024-08-16

## 2022-12-08 RX ORDER — IBUPROFEN 200 MG
200 TABLET ORAL EVERY 4 HOURS PRN
Qty: 60 TABLET | Refills: 0 | Status: SHIPPED | OUTPATIENT
Start: 2022-12-08 | End: 2023-08-25

## 2022-12-08 RX ORDER — ALBUTEROL SULFATE 90 UG/1
2 AEROSOL, METERED RESPIRATORY (INHALATION) EVERY 6 HOURS
Qty: 18 G | Refills: 3 | Status: SHIPPED | OUTPATIENT
Start: 2022-12-08 | End: 2023-06-22

## 2022-12-08 NOTE — PROGRESS NOTES
"ASSESSMENT/PLAN:  (J06.9) Viral URI with cough  (primary encounter diagnosis)  (R05.1) Acute cough  (R50.9) Low grade fever  Comment: Viral URI without fever. Cough biggest sx. Some end expiratory wheezing but otherwise clear lungs and breathing comfortably.  Plan:   -dextromethorphan (TUSSIN COUGH) 15 MG/5ML syrup  -Ibuprofen (ADVIL/MOTRIN) 200 MG tablet  -Symptomatic COVID-19 Virus (Coronavirus) by PCR Nose, Influenza A & B Antigen - Clinic Collect  -Counseled on OTCs, supportive cares and follow-up precautions    (J45.21) Mild intermittent asthma with exacerbation  Comment: Asthma a bit more flared with current viral URI. Satting 99% on RA and breathing comfortably but with some end expiratory wheezing. Needs meds refilled.  Plan:  -albuterol (PROAIR HFA/PROVENTIL HFA/VENTOLIN HFA) 108 (90 Base) MCG/ACT inhaler, albuterol (PROVENTIL) (2.5 MG/3ML) 0.083% neb solution        Appropriate PPE worn.    Options for treatment and follow-up care were reviewed with the patient and/or guardian. Rafael Duran and/or guardian engaged in the decision making process and verbalized understanding of the options discussed and agreed with the final plan.    See [facility] for orders, medications, letters, patient instructions  This note was dictated with [employer].      J Carlos Turner MD    SUBJECTIVE:  Rafael Duran is an 32 year old male who presents for \"Cold\".    Cough started Sunday night.  Rhinorrhea, congestion, body aches.  Dad had flu 1.5 weeks ago.  Does have asthma and has been using inhalers more frequently- needs refills today.  Denies fevers.  Really been the cough that's been bothering him the most.    PMH:   has a past medical history of Asthma.  Patient Active Problem List   Diagnosis     Obesity     CARDIOVASCULAR SCREENING; LDL GOAL LESS THAN 130     Intermittent asthma     Nicotine abuse     Bilateral low back pain without sciatica     Morbid obesity (H)     Social History     Socioeconomic History "     Marital status: Single   Occupational History     Occupation: PERSONAL CARE ATTENDANT     Employer: [employer]   Tobacco Use     Smoking status: Former     Packs/day: 0.00     Years: 8.00     Pack years: 0.00     Types: Cigarettes     Smokeless tobacco: Never     Tobacco comments:     5-6 ciggarettes per day as of 7/16/2014 . Stopped smoking 1 year ago.11/2/2019   Vaping Use     Vaping Use: Never used   Substance and Sexual Activity     Alcohol use: Yes     Comment: Rare- once a month      Drug use: No     Sexual activity: Yes   Other Topics Concern     Parent/sibling w/ CABG, MI or angioplasty before 65F 55M? No     Family History   Problem Relation Age of Onset     Hypertension Paternal Grandfather      Asthma Paternal Grandfather      Asthma Father      Asthma Paternal Grandmother      Respiratory Paternal Grandmother         SHARLENE     Asthma Paternal Uncle      Asthma Paternal Aunt      Diabetes No family hx of      Cancer - colorectal No family hx of      C.A.D. No family hx of      Cerebrovascular Disease No family hx of        ALLERGIES:  Patient has no known allergies.    Current Outpatient Medications   Medication     albuterol (PROAIR HFA/PROVENTIL HFA/VENTOLIN HFA) 108 (90 Base) MCG/ACT inhaler     albuterol (PROVENTIL) (2.5 MG/3ML) 0.083% neb solution     dextromethorphan (TUSSIN COUGH) 15 MG/5ML syrup     fluticasone (FLONASE) 50 MCG/ACT nasal spray     fluticasone-salmeterol (ADVAIR HFA) 115-21 MCG/ACT inhaler     ibuprofen (ADVIL/MOTRIN) 200 MG tablet     triamcinolone (NASACORT) 55 MCG/ACT nasal aerosol     No current facility-administered medications for this visit.         ROS:  ROS is done and is negative for general/constitutional, eye, ENT, Respiratory, cardiovascular, GI, , Skin, musculoskeletal except as noted elsewhere.  All other review of systems negative except as noted elsewhere.    OBJECTIVE:  /78   Pulse 82   Temp 99.2  F (37.3  C) (Tympanic)   SpO2 99%   General: Sitting  comfortably. No acute distress.   HEENT: Conjunctivae are clear without discharge or erythema. Rhinorrhea in b/l nares.   Neck: No masses.   Respiratory: End expiratory wheezing; no respiratory distress; no consolidations. Cough during exam.  Cardiac: RRR. Warm and well perfused. Brisk cap refill.  Abdominal: Abdomen is soft and non-tender.  Extremities: Upper and lower extremities grossly normal.  Skin: Skin warm without rashes.  Neurological: Motor function is grossly normal. Normal gait.  Psychiatric: Good insight and judgement.      RESULTS  Results for orders placed or performed in visit on 12/08/22   Influenza A & B Antigen - Clinic Collect     Status: Normal    Specimen: Nose; Swab   Result Value Ref Range    Influenza A antigen Negative Negative    Influenza B antigen Negative Negative    Narrative    Test results must be correlated with clinical data. If necessary, results should be confirmed by a molecular assay or viral culture.     No results found for this or any previous visit (from the past 48 hour(s)).

## 2022-12-09 LAB — SARS-COV-2 RNA RESP QL NAA+PROBE: NEGATIVE

## 2022-12-19 ENCOUNTER — TELEPHONE (OUTPATIENT)
Dept: FAMILY MEDICINE | Facility: CLINIC | Age: 32
End: 2022-12-19

## 2022-12-19 NOTE — TELEPHONE ENCOUNTER
Patient Quality Outreach    Patient is due for the following:   Asthma  -  ACT needed    Next Steps:   Patient needs to complete an ACT.    Type of outreach:    Phone, left message for patient/parent to call back.      Questions for provider review:    None     Nicole Caro, CMA

## 2022-12-31 DIAGNOSIS — J45.21 MILD INTERMITTENT ASTHMA WITH EXACERBATION: ICD-10-CM

## 2023-01-02 NOTE — TELEPHONE ENCOUNTER
"[employer] System[employer] message sent to pt asking him to update ACT.   Requested Prescriptions   Pending Prescriptions Disp Refills    ADVAIR -21 MCG/ACT inhaler [Pharmacy Med Name: ADVAIR -21MCG/ACT AERO] 12 g 3     Sig: INHALE TWO PUFFS BY MOUTH TWICE A DAY       Inhaled Steroids Protocol Failed - 12/31/2022  6:30 AM        Failed - Asthma control assessment score within normal limits in last 6 months     Please review ACT score.           Passed - Patient is age 12 or older        Passed - Medication is active on med list        Passed - Recent (6 mo) or future (30 days) visit within the authorizing provider's specialty     Patient had office visit in the last 6 months or has a visit in the next 30 days with authorizing provider or within the authorizing provider's specialty.  See \"Patient Info\" tab in inbasket, or \"Choose Columns\" in Meds & Orders section of the refill encounter.           Long-Acting Beta Agonist Inhalers Protocol  Failed - 12/31/2022  6:30 AM        Failed - Asthma control assessment score within normal limits in last 6 months     Please review ACT score.           Passed - Patient is age 12 or older        Passed - Medication is active on med list        Passed - Recent (6 mo) or future (30 days) visit within the authorizing provider's specialty     Patient had office visit in the last 6 months or has a visit in the next 30 days with authorizing provider or within the authorizing provider's specialty.  See \"Patient Info\" tab in inbasket, or \"Choose Columns\" in Meds & Orders section of the refill encounter.               Tata Ayala RN     "

## 2023-01-03 RX ORDER — FLUTICASONE PROPIONATE AND SALMETEROL XINAFOATE 115; 21 UG/1; UG/1
AEROSOL, METERED RESPIRATORY (INHALATION)
Qty: 12 G | Refills: 3 | Status: SHIPPED | OUTPATIENT
Start: 2023-01-03 | End: 2023-06-19

## 2023-06-16 DIAGNOSIS — J45.21 MILD INTERMITTENT ASTHMA WITH EXACERBATION: ICD-10-CM

## 2023-06-19 RX ORDER — FLUTICASONE PROPIONATE AND SALMETEROL XINAFOATE 115; 21 UG/1; UG/1
AEROSOL, METERED RESPIRATORY (INHALATION)
Qty: 12 G | Refills: 3 | Status: SHIPPED | OUTPATIENT
Start: 2023-06-19 | End: 2023-08-25

## 2023-06-20 DIAGNOSIS — J45.21 MILD INTERMITTENT ASTHMA WITH EXACERBATION: ICD-10-CM

## 2023-06-21 NOTE — TELEPHONE ENCOUNTER
ACT sent via My Chart with reminder he is due for clinic follow up.  Last Written Prescription Date:  12/08/22  Last Fill Quantity: 18 g,  # refills: 3   Last office visit: 7/28/2022 ; last virtual visit: Visit date not found with prescribing provider:    Future Office Visit:    Routing refill request to provider for review/approval because:  Last clinic appt and ACT > 6 mos ago.   Neida NEGRON RN                   Pt calm/cooperative/pleasant. Provided w/ water. Updated on POC. No further needs identified at this time.

## 2023-06-22 RX ORDER — ALBUTEROL SULFATE 90 UG/1
2 AEROSOL, METERED RESPIRATORY (INHALATION) EVERY 6 HOURS
Qty: 18 G | Refills: 0 | Status: SHIPPED | OUTPATIENT
Start: 2023-06-22 | End: 2023-08-25

## 2023-08-25 ENCOUNTER — OFFICE VISIT (OUTPATIENT)
Dept: FAMILY MEDICINE | Facility: CLINIC | Age: 33
End: 2023-08-25
Payer: COMMERCIAL

## 2023-08-25 VITALS
HEART RATE: 72 BPM | BODY MASS INDEX: 46.65 KG/M2 | RESPIRATION RATE: 20 BRPM | SYSTOLIC BLOOD PRESSURE: 148 MMHG | DIASTOLIC BLOOD PRESSURE: 85 MMHG | HEIGHT: 69 IN | OXYGEN SATURATION: 97 % | TEMPERATURE: 98.4 F | WEIGHT: 315 LBS

## 2023-08-25 DIAGNOSIS — E66.01 CLASS 3 SEVERE OBESITY WITHOUT SERIOUS COMORBIDITY WITH BODY MASS INDEX (BMI) OF 60.0 TO 69.9 IN ADULT, UNSPECIFIED OBESITY TYPE (H): ICD-10-CM

## 2023-08-25 DIAGNOSIS — J45.40 MODERATE PERSISTENT ASTHMA WITHOUT COMPLICATION: ICD-10-CM

## 2023-08-25 DIAGNOSIS — F32.A DEPRESSION, UNSPECIFIED DEPRESSION TYPE: ICD-10-CM

## 2023-08-25 DIAGNOSIS — E66.813 CLASS 3 SEVERE OBESITY WITHOUT SERIOUS COMORBIDITY WITH BODY MASS INDEX (BMI) OF 60.0 TO 69.9 IN ADULT, UNSPECIFIED OBESITY TYPE (H): ICD-10-CM

## 2023-08-25 DIAGNOSIS — R03.0 ELEVATED BLOOD PRESSURE READING WITHOUT DIAGNOSIS OF HYPERTENSION: ICD-10-CM

## 2023-08-25 DIAGNOSIS — E66.01 MORBID OBESITY (H): ICD-10-CM

## 2023-08-25 DIAGNOSIS — Z00.00 ROUTINE GENERAL MEDICAL EXAMINATION AT A HEALTH CARE FACILITY: Primary | ICD-10-CM

## 2023-08-25 LAB
ANION GAP SERPL CALCULATED.3IONS-SCNC: 9 MMOL/L (ref 7–15)
BUN SERPL-MCNC: 16.8 MG/DL (ref 6–20)
CALCIUM SERPL-MCNC: 9.8 MG/DL (ref 8.6–10)
CHLORIDE SERPL-SCNC: 102 MMOL/L (ref 98–107)
CHOLEST SERPL-MCNC: 172 MG/DL
CREAT SERPL-MCNC: 0.73 MG/DL (ref 0.67–1.17)
DEPRECATED HCO3 PLAS-SCNC: 29 MMOL/L (ref 22–29)
GFR SERPL CREATININE-BSD FRML MDRD: >90 ML/MIN/1.73M2
GLUCOSE SERPL-MCNC: 93 MG/DL (ref 70–99)
HDLC SERPL-MCNC: 41 MG/DL
LDLC SERPL CALC-MCNC: 104 MG/DL
NONHDLC SERPL-MCNC: 131 MG/DL
POTASSIUM SERPL-SCNC: 4.1 MMOL/L (ref 3.4–5.3)
SODIUM SERPL-SCNC: 140 MMOL/L (ref 136–145)
TRIGL SERPL-MCNC: 135 MG/DL

## 2023-08-25 PROCEDURE — 80061 LIPID PANEL: CPT | Performed by: FAMILY MEDICINE

## 2023-08-25 PROCEDURE — 80048 BASIC METABOLIC PNL TOTAL CA: CPT | Performed by: FAMILY MEDICINE

## 2023-08-25 PROCEDURE — 36415 COLL VENOUS BLD VENIPUNCTURE: CPT | Performed by: FAMILY MEDICINE

## 2023-08-25 PROCEDURE — 99214 OFFICE O/P EST MOD 30 MIN: CPT | Mod: 25 | Performed by: FAMILY MEDICINE

## 2023-08-25 PROCEDURE — 99395 PREV VISIT EST AGE 18-39: CPT | Performed by: FAMILY MEDICINE

## 2023-08-25 RX ORDER — ALBUTEROL SULFATE 90 UG/1
2 AEROSOL, METERED RESPIRATORY (INHALATION) EVERY 6 HOURS
Qty: 18 G | Refills: 0 | Status: SHIPPED | OUTPATIENT
Start: 2023-08-25 | End: 2024-02-16

## 2023-08-25 RX ORDER — CITALOPRAM HYDROBROMIDE 10 MG/1
10 TABLET ORAL DAILY
Qty: 90 TABLET | Refills: 1 | Status: SHIPPED | OUTPATIENT
Start: 2023-08-25 | End: 2024-02-16

## 2023-08-25 RX ORDER — FLUTICASONE PROPIONATE AND SALMETEROL 250; 50 UG/1; UG/1
1 POWDER RESPIRATORY (INHALATION) EVERY 12 HOURS
Qty: 3 EACH | Refills: 3 | Status: SHIPPED | OUTPATIENT
Start: 2023-08-25 | End: 2024-08-16

## 2023-08-25 ASSESSMENT — ENCOUNTER SYMPTOMS
CHILLS: 0
NAUSEA: 0
HEARTBURN: 0
DIZZINESS: 0
EYE PAIN: 0
ARTHRALGIAS: 0
FEVER: 0
MYALGIAS: 1
FREQUENCY: 0
HEADACHES: 0
WEAKNESS: 0
COUGH: 0
DYSURIA: 0
HEMATOCHEZIA: 0
SHORTNESS OF BREATH: 1
ABDOMINAL PAIN: 0
JOINT SWELLING: 0
WHEEZING: 1
CONSTIPATION: 0
NERVOUS/ANXIOUS: 1
HEMATURIA: 0
SORE THROAT: 0
PALPITATIONS: 0
PARESTHESIAS: 0
DIARRHEA: 0

## 2023-08-25 ASSESSMENT — ANXIETY QUESTIONNAIRES
IF YOU CHECKED OFF ANY PROBLEMS ON THIS QUESTIONNAIRE, HOW DIFFICULT HAVE THESE PROBLEMS MADE IT FOR YOU TO DO YOUR WORK, TAKE CARE OF THINGS AT HOME, OR GET ALONG WITH OTHER PEOPLE: VERY DIFFICULT
7. FEELING AFRAID AS IF SOMETHING AWFUL MIGHT HAPPEN: MORE THAN HALF THE DAYS
5. BEING SO RESTLESS THAT IT IS HARD TO SIT STILL: MORE THAN HALF THE DAYS
1. FEELING NERVOUS, ANXIOUS, OR ON EDGE: SEVERAL DAYS
6. BECOMING EASILY ANNOYED OR IRRITABLE: SEVERAL DAYS
2. NOT BEING ABLE TO STOP OR CONTROL WORRYING: NEARLY EVERY DAY
GAD7 TOTAL SCORE: 13
3. WORRYING TOO MUCH ABOUT DIFFERENT THINGS: MORE THAN HALF THE DAYS
GAD7 TOTAL SCORE: 13
4. TROUBLE RELAXING: MORE THAN HALF THE DAYS

## 2023-08-25 ASSESSMENT — PATIENT HEALTH QUESTIONNAIRE - PHQ9
10. IF YOU CHECKED OFF ANY PROBLEMS, HOW DIFFICULT HAVE THESE PROBLEMS MADE IT FOR YOU TO DO YOUR WORK, TAKE CARE OF THINGS AT HOME, OR GET ALONG WITH OTHER PEOPLE: VERY DIFFICULT
SUM OF ALL RESPONSES TO PHQ QUESTIONS 1-9: 17
SUM OF ALL RESPONSES TO PHQ QUESTIONS 1-9: 17

## 2023-08-25 ASSESSMENT — PAIN SCALES - GENERAL: PAINLEVEL: NO PAIN (0)

## 2023-08-25 ASSESSMENT — ASTHMA QUESTIONNAIRES: ACT_TOTALSCORE: 11

## 2023-08-25 NOTE — NURSING NOTE
"Chief Complaint   Patient presents with    Physical    Asthma    Allergies    Mental Health Problem       Initial There were no vitals taken for this visit. Estimated body mass index is 59.79 kg/m  as calculated from the following:    Height as of 7/28/22: 1.74 m (5' 8.5\").    Weight as of 7/28/22: 181 kg (399 lb).    Patient presents to the clinic using No DME    Is there anyone who you would like to be able to receive your results? No  If yes have patient fill out YI      "

## 2023-08-25 NOTE — PROGRESS NOTES
SUBJECTIVE:   CC: Rafael is an 33 year old who presents for preventative health visit.     Patient is a 33-year-old male here for his annual physical.  He has a past medical history significant for obesity, asthma.  He reports that lately he has been feeling more depressed.  He reports no significant events in his life.  He says he is struggled with the depression for many years but has never been on medication.  He is open to trying medications.      We briefly discussed his weight which is another thing he has struggled with for a long time.  He is open to trying medications.      We also talked about his asthma which has been largely uncontrolled especially because of the bad air quality during the summer.  He says he is having more symptoms of wheezing and coughing.  We looked at his meds and he is on Advair 115/21 and I increase the dose to 250/50.  Recommend staying indoors when the air quality is bad.  We ordered labs today for him.  Immunizations were reviewed and is up-to-date with his immunizations.      8/25/2023     9:55 AM   Additional Questions   Roomed by rhett hilario cma       Healthy Habits:     Getting at least 3 servings of Calcium per day:  Yes    Bi-annual eye exam:  NO    Dental care twice a year:  NO    Sleep apnea or symptoms of sleep apnea:  Daytime drowsiness and Excessive snoring    Diet:  Regular (no restrictions)    Frequency of exercise:  2-3 days/week    Duration of exercise:  15-30 minutes    Taking medications regularly:  No    Barriers to taking medications:  None    Medication side effects:  Not applicable    Additional concerns today:  No    Asthma Follow-Up    Was ACT completed today?  Yes        8/25/2023     5:36 AM   ACT Total Scores   ACT TOTAL SCORE (Goal Greater than or Equal to 20) 11   In the past 12 months, how many times did you visit the emergency room for your asthma without being admitted to the hospital? 0   In the past 12 months, how many times were you hospitalized  overnight because of your asthma? 0       How many days per week do you miss taking your asthma controller medication?  1  Please describe any recent triggers for your asthma: smoke, dust mites, pollens, animal dander, mold, humidity, strong odors and fumes, exercise or sports, emotions, and cold air  Have you had any Emergency Room Visits, Urgent Care Visits, or Hospital Admissions since your last office visit?  No  Allergies    Stuffy nose, itchy nose and Asthma flares up   How many servings of fruits and vegetables do you eat daily?  2-3  On average, how many sweetened beverages do you drink each day (Examples: soda, juice, sweet tea, etc.  Do NOT count diet or artificially sweetened beverages)?   0  How many days per week do you exercise enough to make your heart beat faster? 3 or less  How many minutes a day do you exercise enough to make your heart beat faster? 9 or less  How many days per week do you miss taking your medication? 1  What makes it hard for you to take your medications?  remembering to take '  Today's PHQ-9 Score:       8/25/2023     5:26 AM   PHQ-9 SCORE   PHQ-9 Total Score MyChart 17 (Moderately severe depression)   PHQ-9 Total Score 17       Depression or Anxiety - New Diagnosis   Duration of complaint: years    Abnormal Mood Symptoms    Duration: years   Description:  Depression: YES  Anxiety: YES  Panic attacks: YES   Accompanying signs and symptoms: see PHQ-9 and KATHLEEN scores  History (similar episodes/previous evaluation): None  Precipitating or alleviating factors: insomnia, loss of interest,   Therapies tried and outcome: none      Pt would also like to discuss weight loss    Social History     Tobacco Use    Smoking status: Former     Packs/day: 0.00     Years: 8.00     Pack years: 0.00     Types: Cigarettes    Smokeless tobacco: Never    Tobacco comments:     5-6 ciggarettes per day as of 7/16/2014 . Stopped smoking 1 year ago.11/2/2019   Substance Use Topics    Alcohol use: Yes      Comment: Rare- once a month          8/25/2023     5:29 AM   Alcohol Use   Prescreen: >3 drinks/day or >7 drinks/week? Not Applicable          No data to display                Last PSA: No results found for: PSA    Reviewed orders with patient. Reviewed health maintenance and updated orders accordingly - Yes  Lab work is in process  Labs reviewed in EPIC  BP Readings from Last 3 Encounters:   08/25/23 (!) 148/85   12/08/22 134/78   07/28/22 136/80    Wt Readings from Last 3 Encounters:   08/25/23 (!) 188.2 kg (415 lb)   07/28/22 (!) 181 kg (399 lb)   12/16/21 (!) 174.2 kg (384 lb)                  Patient Active Problem List   Diagnosis    Obesity    CARDIOVASCULAR SCREENING; LDL GOAL LESS THAN 130    Intermittent asthma    Nicotine abuse    Bilateral low back pain without sciatica    Morbid obesity (H)     Past Surgical History:   Procedure Laterality Date    NONE         Social History     Tobacco Use    Smoking status: Former     Packs/day: 0.00     Years: 8.00     Pack years: 0.00     Types: Cigarettes    Smokeless tobacco: Never    Tobacco comments:     5-6 ciggarettes per day as of 7/16/2014 . Stopped smoking 1 year ago.11/2/2019   Substance Use Topics    Alcohol use: Yes     Comment: Rare- once a month      Family History   Problem Relation Age of Onset    Hypertension Paternal Grandfather     Asthma Paternal Grandfather     Asthma Father     Asthma Paternal Grandmother     Respiratory Paternal Grandmother         SHARLENE    Asthma Paternal Uncle     Asthma Paternal Aunt     Diabetes No family hx of     Cancer - colorectal No family hx of     C.A.D. No family hx of     Cerebrovascular Disease No family hx of          Current Outpatient Medications   Medication Sig Dispense Refill    albuterol (PROAIR HFA/PROVENTIL HFA/VENTOLIN HFA) 108 (90 Base) MCG/ACT inhaler Inhale 2 puffs into the lungs every 6 hours Needs appointment for further refills 18 g 0    albuterol (PROVENTIL) (2.5 MG/3ML) 0.083% neb solution  NEBULIZE CONTENTS OF ONE VIAL EVERY 4 HOURS AS NEEDED FOR SHORTNESS OF BREATH /DYSPNEA OR WHEEZING Strength: (2.5 MG/3ML) 0.083% 27 mL 3    citalopram (CELEXA) 10 MG tablet Take 1 tablet (10 mg) by mouth daily 90 tablet 1    fluticasone (FLONASE) 50 MCG/ACT nasal spray Spray 1 spray into both nostrils daily      fluticasone-salmeterol (ADVAIR) 250-50 MCG/ACT inhaler Inhale 1 puff into the lungs every 12 hours 3 each 3    Semaglutide-Weight Management (WEGOVY) 0.25 MG/0.5ML pen Inject 0.25 mg Subcutaneous once a week 2 mL 0    triamcinolone (NASACORT) 55 MCG/ACT nasal aerosol Spray 2 sprays into both nostrils daily       No Known Allergies    Reviewed and updated as needed this visit by clinical staff   Tobacco  Allergies  Meds  Problems  Med Hx  Surg Hx  Fam Hx          Reviewed and updated as needed this visit by Provider     Meds  Problems            Past Medical History:   Diagnosis Date    Asthma       Past Surgical History:   Procedure Laterality Date    NONE         Review of Systems   Constitutional:  Negative for chills and fever.   HENT:  Negative for congestion, ear pain, hearing loss and sore throat.    Eyes:  Negative for pain and visual disturbance.   Respiratory:  Positive for shortness of breath and wheezing. Negative for cough.    Cardiovascular:  Negative for chest pain, palpitations and peripheral edema.   Gastrointestinal:  Negative for abdominal pain, constipation, diarrhea, heartburn, hematochezia and nausea.   Genitourinary:  Negative for dysuria, frequency, genital sores, hematuria, impotence, penile discharge and urgency.   Musculoskeletal:  Positive for myalgias. Negative for arthralgias and joint swelling.   Skin:  Negative for rash.   Neurological:  Negative for dizziness, weakness, headaches and paresthesias.   Psychiatric/Behavioral:  Positive for mood changes. The patient is nervous/anxious.          OBJECTIVE:   BP (!) 148/85 (BP Location: Right arm, Patient Position: Sitting,  "Cuff Size: Adult Regular)   Pulse 72   Temp 98.4  F (36.9  C) (Tympanic)   Resp 20   Ht 1.74 m (5' 8.5\")   Wt (!) 188.2 kg (415 lb)   SpO2 97%   BMI 62.18 kg/m      Physical Exam  GENERAL: healthy, alert and no distress  NECK: no adenopathy, no asymmetry, masses, or scars and thyroid normal to palpation  RESP: lungs clear to auscultation - no rales, rhonchi or wheezes  CV: regular rate and rhythm, normal S1 S2, no S3 or S4, no murmur, click or rub, no peripheral edema and peripheral pulses strong  ABDOMEN: soft, nontender, no hepatosplenomegaly, no masses and bowel sounds normal  MS: no gross musculoskeletal defects noted, no edema  SKIN: no suspicious lesions or rashes    Diagnostic Test Results:  Pending     ASSESSMENT/PLAN:       ICD-10-CM    1. Routine general medical examination at a health care facility  Z00.00 Lipid panel reflex to direct LDL Fasting     Basic metabolic panel  (Ca, Cl, CO2, Creat, Gluc, K, Na, BUN)      2. Morbid obesity (H)  E66.01 OFFICE/OUTPT VISIT,EST,LEVL IV      3. Moderate persistent asthma without complication  J45.40 fluticasone-salmeterol (ADVAIR) 250-50 MCG/ACT inhaler     albuterol (PROAIR HFA/PROVENTIL HFA/VENTOLIN HFA) 108 (90 Base) MCG/ACT inhaler     OFFICE/OUTPT VISIT,EST,LEVL IV      4. Depression, unspecified depression type  F32.A citalopram (CELEXA) 10 MG tablet     OFFICE/OUTPT VISIT,EST,LEVL IV      5. Class 3 severe obesity without serious comorbidity with body mass index (BMI) of 60.0 to 69.9 in adult, unspecified obesity type (H)  E66.01 Semaglutide-Weight Management (WEGOVY) 0.25 MG/0.5ML pen    Z68.44 OFFICE/OUTPT VISIT,EST,LEVL IV      6. Elevated blood pressure reading without diagnosis of hypertension  R03.0 OFFICE/OUTPT VISIT,EST,LEVL IV        33 yr old male here for his annual physical.    Doing okay overall.     His medications were filled.     Discussed with patient about his weight and he is willing to try medication.     Recommend recheck of BP in " a couple of weeks.       Patient has been advised of split billing requirements and indicates understanding: Yes    Depression Screening Follow Up        8/25/2023     5:26 AM   PHQ   PHQ-9 Total Score 17   Q9: Thoughts of better off dead/self-harm past 2 weeks Not at all         8/25/2023     5:26 AM   Last PHQ-9   1.  Little interest or pleasure in doing things 3   2.  Feeling down, depressed, or hopeless 2   3.  Trouble falling or staying asleep, or sleeping too much 1   4.  Feeling tired or having little energy 3   5.  Poor appetite or overeating 3   6.  Feeling bad about yourself 1   7.  Trouble concentrating 3   8.  Moving slowly or restless 1   Q9: Thoughts of better off dead/self-harm past 2 weeks 0   PHQ-9 Total Score 17       Follow Up Actions Taken  Crisis resource information provided in After Visit Summary       COUNSELING:   Reviewed preventive health counseling, as reflected in patient instructions       Regular exercise       Healthy diet/nutrition        He reports that he has quit smoking. His smoking use included cigarettes. He has never used smokeless tobacco.            Joe Polanco MD  [facility] Hospital[facility]swers submitted by the patient for this visit:  Patient Health Questionnaire (Submitted on 8/25/2023)  If you checked off any problems, how difficult have these problems made it for you to do your work, take care of things at home, or get along with other people?: Very difficult  PHQ9 TOTAL SCORE: 17  KATHLEEN-7 (Submitted on 8/25/2023)  KATHLEEN 7 TOTAL SCORE: 13

## 2023-08-26 ENCOUNTER — MYC MEDICAL ADVICE (OUTPATIENT)
Dept: FAMILY MEDICINE | Facility: CLINIC | Age: 33
End: 2023-08-26
Payer: COMMERCIAL

## 2023-08-26 NOTE — RESULT ENCOUNTER NOTE
Please inform patient that test result was within normal parameters except the LDL was slightly elevated. Recommend lifestyle changes.  Thank you.     oJe Polanco M.D.

## 2023-08-28 NOTE — TELEPHONE ENCOUNTER
MyChart reply sent to patient and closing encounter.     Malou Morrow RN  [facility]

## 2023-09-08 ENCOUNTER — TELEPHONE (OUTPATIENT)
Dept: FAMILY MEDICINE | Facility: CLINIC | Age: 33
End: 2023-09-08

## 2023-09-08 ENCOUNTER — ALLIED HEALTH/NURSE VISIT (OUTPATIENT)
Dept: FAMILY MEDICINE | Facility: CLINIC | Age: 33
End: 2023-09-08
Payer: COMMERCIAL

## 2023-09-08 VITALS — HEART RATE: 68 BPM | SYSTOLIC BLOOD PRESSURE: 132 MMHG | DIASTOLIC BLOOD PRESSURE: 74 MMHG

## 2023-09-08 DIAGNOSIS — R03.0 ELEVATED BLOOD PRESSURE READING WITHOUT DIAGNOSIS OF HYPERTENSION: Primary | ICD-10-CM

## 2023-09-08 PROCEDURE — 99207 PR NO CHARGE NURSE ONLY: CPT

## 2023-09-08 NOTE — TELEPHONE ENCOUNTER
Rafael Duran is a 33 year old year old patient who comes in today for a Blood Pressure check because of ongoing blood pressure monitoring. BP was elevated at last office visit.     Vital Signs as repeated by RN: BP- 142/74, P- 68. Rechecked after 5 minutes: BP- 132/74.  Patient is taking medication as prescribed  Patient is tolerating medications well.  Patient is not monitoring Blood Pressure at home.   Current complaints: none  Disposition:  Routing to PCP for review/recommendation.     Annalee Diehl RN  [facility]

## 2023-09-08 NOTE — NURSING NOTE
Rafael Duran is a 33 year old year old patient who comes in today for a Blood Pressure check because of ongoing blood pressure monitoring. BP was elevated at last office visit.    Vital Signs as repeated by RN: BP- 142/74, P- 68. Rechecked after 5 minutes: BP- 132/74.  Patient is taking medication as prescribed  Patient is tolerating medications well.  Patient is not monitoring Blood Pressure at home.   Current complaints: none  Disposition:  Routing to PCP for review/recommendation.    Annalee Diehl RN  [facility]

## 2023-09-20 ENCOUNTER — OFFICE VISIT (OUTPATIENT)
Dept: FAMILY MEDICINE | Facility: CLINIC | Age: 33
End: 2023-09-20
Payer: COMMERCIAL

## 2023-09-20 VITALS
HEIGHT: 69 IN | BODY MASS INDEX: 46.65 KG/M2 | OXYGEN SATURATION: 97 % | TEMPERATURE: 98.8 F | RESPIRATION RATE: 12 BRPM | HEART RATE: 78 BPM | WEIGHT: 315 LBS | DIASTOLIC BLOOD PRESSURE: 68 MMHG | SYSTOLIC BLOOD PRESSURE: 138 MMHG

## 2023-09-20 DIAGNOSIS — G56.02 CARPAL TUNNEL SYNDROME OF LEFT WRIST: Primary | ICD-10-CM

## 2023-09-20 PROCEDURE — 99213 OFFICE O/P EST LOW 20 MIN: CPT | Mod: 25 | Performed by: FAMILY MEDICINE

## 2023-09-20 PROCEDURE — 90472 IMMUNIZATION ADMIN EACH ADD: CPT | Performed by: FAMILY MEDICINE

## 2023-09-20 PROCEDURE — 90677 PCV20 VACCINE IM: CPT | Performed by: FAMILY MEDICINE

## 2023-09-20 PROCEDURE — 90471 IMMUNIZATION ADMIN: CPT | Performed by: FAMILY MEDICINE

## 2023-09-20 PROCEDURE — 90686 IIV4 VACC NO PRSV 0.5 ML IM: CPT | Performed by: FAMILY MEDICINE

## 2023-09-20 ASSESSMENT — PAIN SCALES - GENERAL: PAINLEVEL: MILD PAIN (3)

## 2023-09-20 NOTE — PROGRESS NOTES
"  Assessment & Plan     Carpal tunnel syndrome of left wrist  Most likely diagnosis with positive Tinel's sign and distribution of numbness and intermittent symptoms.  Discussed  Plan wrist brace at night or with flares  Weight loss  Home exercises given  If not improving or worsening then will refer to hand ortho       BMI:   Estimated body mass index is 59.96 kg/m  as calculated from the following:    Height as of this encounter: 1.759 m (5' 9.25\").    Weight as of this encounter: 185.5 kg (409 lb).       See Patient Instructions    Chacho Diaz MD  [facility] RUDDY Hall is a 33 year old, presenting for the following health issues:  Musculoskeletal Problem (Hand and wrist pain)      9/20/2023     3:26 PM   Additional Questions   Roomed by Melissa ROGERS   Accompanied by self         9/20/2023     3:26 PM   Patient Reported Additional Medications   Patient reports taking the following new medications none       History of Present Illness       Reason for visit:  Wrist and hand pain  Symptom onset:  More than a month  Symptoms include:  Sharp pain in wrist and hand, tingling on middle and ring finger,  Symptom intensity:  Moderate  Symptom progression:  Staying the same  Had these symptoms before:  Yes  Has tried/received treatment for these symptoms:  No  What makes it better:  No    He eats 2-3 servings of fruits and vegetables daily.He consumes 2 sweetened beverage(s) daily.He exercises with enough effort to increase his heart rate 10 to 19 minutes per day.  He exercises with enough effort to increase his heart rate 4 days per week. He is missing 2 dose(s) of medications per week.  He is not taking prescribed medications regularly due to remembering to take.       Pain History:  When did you first notice your pain? Off and on both hands for aobut 1 years, worse in left hand for the past week. Numbness, pins/needles feeling in the left fourth and middle fingers.     Have you seen " "anyone else for your pain? No  How has your pain affected your ability to work?   Where in your body do you have pain? Musculoskeletal problem/pain  Onset/Duration: off and on x 1 year, worse over the past week  Description  Location: hand and wrist - left  Joint Swelling: No  Redness: No  Pain: YES start wrist and shoots up to elbow and down.  Warmth: No  Intensity:  3/10  Progression of Symptoms:  worsening  Accompanying signs and symptoms:   Fevers: No  Numbness/tingling/weakness: YES  History  Trauma to the area: No  Recent illness:  No  Previous similar problem: YES- not consistent  Previous evaluation:  No  Precipitating or alleviating factors:  Aggravating factors include: none  Therapies tried and outcome: ice, Ibuprofen, and cream  During this recent flare sharp shooting pain up and down the wrist with lifting or certain movements.    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    Sprained wrist a few times as a kid, no broken bones or casts.        Review of Systems         Objective    /68 (BP Location: Right arm, Patient Position: Chair, Cuff Size: Adult Large)   Pulse 78   Temp 98.8  F (37.1  C) (Tympanic)   Resp 12   Ht 1.759 m (5' 9.25\")   Wt (!) 185.5 kg (409 lb)   SpO2 97%   BMI 59.96 kg/m    Body mass index is 59.96 kg/m .  Physical Exam   GENERAL APPEARANCE: healthy, alert, and no distress  ORTHO: Wrist Exam: WRIST:  Inspection: no swelling  Palpation: Tender: symptoms of numbness worse over the left ring and middle finger with palpation of the dorsal medial wrist.  Non-tender: all  Range of Motion: normal  Strength: no deficits  Special tests: positive Tinel's at carpal tunnel.      ELBOW:  No swelling, full Range of motion     Hand/Finger Exam: Inspection:All Normal  Tender: none  Non-tender: Metacarpals:  , Thumb:   , Index Finger:   , Long Finger:   , RIng Finger:   , Small Finger:    Range of Motion All Normal  Strength: full strength        "

## 2023-09-20 NOTE — NURSING NOTE
Prior to immunization administration, verified patients identity using patient s name and date of birth. Please see Immunization Activity for additional information.     Screening Questionnaire for Adult Immunization    Are you sick today?   No   Do you have allergies to medications, food, a vaccine component or latex?   No   Have you ever had a serious reaction after receiving a vaccination?   No   Do you have a long-term health problem with heart, lung, kidney, or metabolic disease (e.g., diabetes), asthma, a blood disorder, no spleen, complement component deficiency, a cochlear implant, or a spinal fluid leak?  Are you on long-term aspirin therapy?   No   Do you have cancer, leukemia, HIV/AIDS, or any other immune system problem?   No   Do you have a parent, brother, or sister with an immune system problem?   No   In the past 3 months, have you taken medications that affect  your immune system, such as prednisone, other steroids, or anticancer drugs; drugs for the treatment of rheumatoid arthritis, Crohn s disease, or psoriasis; or have you had radiation treatments?   No   Have you had a seizure, or a brain or other nervous system problem?   No   During the past year, have you received a transfusion of blood or blood    products, or been given immune (gamma) globulin or antiviral drug?   No   For women: Are you pregnant or is there a chance you could become       pregnant during the next month?   No   Have you received any vaccinations in the past 4 weeks?   No     Immunization questionnaire answers were all negative.      Patient instructed to remain in clinic for 15 minutes afterwards, and to report any adverse reactions.     Screening performed by Kavita Sethi on 9/20/2023 at 4:29 PM.

## 2023-10-24 ENCOUNTER — OFFICE VISIT (OUTPATIENT)
Dept: URGENT CARE | Facility: URGENT CARE | Age: 33
End: 2023-10-24
Payer: COMMERCIAL

## 2023-10-24 VITALS
WEIGHT: 315 LBS | OXYGEN SATURATION: 96 % | BODY MASS INDEX: 61.58 KG/M2 | HEART RATE: 74 BPM | DIASTOLIC BLOOD PRESSURE: 89 MMHG | SYSTOLIC BLOOD PRESSURE: 146 MMHG | TEMPERATURE: 98.4 F | RESPIRATION RATE: 16 BRPM

## 2023-10-24 DIAGNOSIS — H60.391 INFECTIVE OTITIS EXTERNA, RIGHT: Primary | ICD-10-CM

## 2023-10-24 PROCEDURE — 99213 OFFICE O/P EST LOW 20 MIN: CPT | Performed by: PHYSICIAN ASSISTANT

## 2023-10-24 RX ORDER — NEOMYCIN SULFATE, POLYMYXIN B SULFATE AND HYDROCORTISONE 10; 3.5; 1 MG/ML; MG/ML; [USP'U]/ML
3 SUSPENSION/ DROPS AURICULAR (OTIC) 4 TIMES DAILY
Qty: 10 ML | Refills: 0 | Status: SHIPPED | OUTPATIENT
Start: 2023-10-24 | End: 2023-10-31

## 2023-10-24 RX ORDER — CIPROFLOXACIN AND DEXAMETHASONE 3; 1 MG/ML; MG/ML
4 SUSPENSION/ DROPS AURICULAR (OTIC) 2 TIMES DAILY
Qty: 7.5 ML | Refills: 0 | Status: SHIPPED | OUTPATIENT
Start: 2023-10-24 | End: 2023-10-24

## 2023-10-24 NOTE — PROGRESS NOTES
"  Assessment & Plan     Otitis externa, right  Will treat with Augmentin and cortisporin drops x 7 days. Keep ear dry. Return to clinic if symptoms worsen or do not improve; otherwise follow up as needed      - amoxicillin-clavulanate (AUGMENTIN) 875-125 MG tablet; Take 1 tablet by mouth 2 times daily for 7 days  - neomycin-polymyxin-hydrocortisone (CORTISPORIN) 3.5-36786-3 otic suspension; Place 3 drops into the right ear 4 times daily for 7 days             BMI:   Estimated body mass index is 61.58 kg/m  as calculated from the following:    Height as of 9/20/23: 1.759 m (5' 9.25\").    Weight as of this encounter: 190.5 kg (420 lb).           Return in about 3 days (around 10/27/2023), or if symptoms worsen or fail to improve.    RODERICK Fields [facility]            Subjective   Chief Complaint   Patient presents with    Otalgia     Rt ear pain x 2 days, little clear drainage yesterday night.       HPI     Ear problem     Onset of symptoms was 2 day(s) ago.  Course of illness is same.    Severity moderate  Current and Associated symptoms: ear pain   Treatment measures tried include Tylenol/Ibuprofen.  Predisposing factors include None.                  Review of Systems         Objective    BP (!) 146/89   Pulse 74   Temp 98.4  F (36.9  C) (Tympanic)   Resp 16   Wt (!) 190.5 kg (420 lb)   SpO2 96%   BMI 61.58 kg/m    Body mass index is 61.58 kg/m .  Physical Exam  Constitutional:       General: He is not in acute distress.     Appearance: He is well-developed.   HENT:      Head: Normocephalic and atraumatic.      Right Ear: Tympanic membrane and ear canal normal.      Left Ear: Tympanic membrane and ear canal normal.      Ears:      Comments: Right canal has redness and swelling, unable to see right TM  Eyes:      Conjunctiva/sclera: Conjunctivae normal.   Cardiovascular:      Rate and Rhythm: Normal rate and regular rhythm.   Pulmonary:      Effort: Pulmonary effort is " normal.      Breath sounds: Normal breath sounds.   Skin:     General: Skin is warm and dry.      Findings: No rash.   Psychiatric:         Behavior: Behavior normal.

## 2023-12-07 ENCOUNTER — TELEPHONE (OUTPATIENT)
Dept: FAMILY MEDICINE | Facility: CLINIC | Age: 33
End: 2023-12-07
Payer: COMMERCIAL

## 2023-12-07 NOTE — TELEPHONE ENCOUNTER
Patient Quality Outreach    Patient is due for the following:   Asthma  -  ACT needed    Next Steps:   Patient needs to complete an ACT    Type of outreach:    Sent [employer] message.      Questions for provider review:    None           Nicole Caro, CMA

## 2024-02-15 DIAGNOSIS — F32.A DEPRESSION, UNSPECIFIED DEPRESSION TYPE: ICD-10-CM

## 2024-02-15 DIAGNOSIS — J45.40 MODERATE PERSISTENT ASTHMA WITHOUT COMPLICATION: ICD-10-CM

## 2024-02-16 RX ORDER — ALBUTEROL SULFATE 90 UG/1
1-2 AEROSOL, METERED RESPIRATORY (INHALATION) EVERY 6 HOURS PRN
Qty: 8.5 G | Refills: 3 | Status: SHIPPED | OUTPATIENT
Start: 2024-02-16 | End: 2024-08-16

## 2024-02-16 RX ORDER — CITALOPRAM HYDROBROMIDE 10 MG/1
10 TABLET ORAL DAILY
Qty: 90 TABLET | Refills: 1 | Status: SHIPPED | OUTPATIENT
Start: 2024-02-16 | End: 2024-08-16

## 2024-06-04 ENCOUNTER — TELEPHONE (OUTPATIENT)
Dept: FAMILY MEDICINE | Facility: CLINIC | Age: 34
End: 2024-06-04
Payer: COMMERCIAL

## 2024-06-04 NOTE — LETTER
June 11, 2024      Rafael Duran  [address]  [place] [address]        Dear Rafael,     Your healthcare team cares about your health. To provide you with the best care, we have reviewed your chart and based on our findings, we see that you are due for:   An Asthma Control Test (ACT) that our clinic uses to monitor and manage your asthma. This test is an assessment tool that we use to determine how well your asthma is controlled.  Please complete the enclosed form and return in the provided envelope.  Thank you for choosing [facility] for your healthcare needs. For any questions, concerns, or to schedule an appointment please contact the clinic.   Healthy Regards,    Your [facility] Care Team

## 2024-06-04 NOTE — TELEPHONE ENCOUNTER
Patient Quality Outreach    Patient is due for the following:   Asthma  -  ACT needed    Next Steps:   Complete act    Type of outreach:    Sent [employer] message. Will postpone x 1 week, if not viewed or completed please mail ACT.         Questions for provider review:    None           Melissa Fung, CMA

## 2024-06-11 NOTE — TELEPHONE ENCOUNTER
Patient Quality Outreach    Patient is due for the following:   Asthma  -  ACT needed    Next Steps:   Patient was assigned appropriate questionnaire to complete    Type of outreach:    [employer] message viewed but not completed, letter mailed with ACT to complete      Questions for provider review:    None           Melissa Fung, CMA

## 2024-08-16 ENCOUNTER — OFFICE VISIT (OUTPATIENT)
Dept: FAMILY MEDICINE | Facility: CLINIC | Age: 34
End: 2024-08-16
Payer: COMMERCIAL

## 2024-08-16 VITALS
WEIGHT: 315 LBS | TEMPERATURE: 98.7 F | HEART RATE: 74 BPM | HEIGHT: 69 IN | SYSTOLIC BLOOD PRESSURE: 137 MMHG | OXYGEN SATURATION: 96 % | RESPIRATION RATE: 16 BRPM | DIASTOLIC BLOOD PRESSURE: 73 MMHG | BODY MASS INDEX: 46.65 KG/M2

## 2024-08-16 DIAGNOSIS — J45.40 MODERATE PERSISTENT ASTHMA WITHOUT COMPLICATION: ICD-10-CM

## 2024-08-16 DIAGNOSIS — F32.A DEPRESSION, UNSPECIFIED DEPRESSION TYPE: ICD-10-CM

## 2024-08-16 DIAGNOSIS — Z00.00 ENCOUNTER FOR ROUTINE ADULT HEALTH EXAMINATION WITHOUT ABNORMAL FINDINGS: Primary | ICD-10-CM

## 2024-08-16 DIAGNOSIS — E66.813 CLASS 3 SEVERE OBESITY DUE TO EXCESS CALORIES WITH SERIOUS COMORBIDITY AND BODY MASS INDEX (BMI) OF 60.0 TO 69.9 IN ADULT (H): ICD-10-CM

## 2024-08-16 DIAGNOSIS — E66.01 CLASS 3 SEVERE OBESITY DUE TO EXCESS CALORIES WITH SERIOUS COMORBIDITY AND BODY MASS INDEX (BMI) OF 60.0 TO 69.9 IN ADULT (H): ICD-10-CM

## 2024-08-16 PROCEDURE — 90471 IMMUNIZATION ADMIN: CPT | Performed by: FAMILY MEDICINE

## 2024-08-16 PROCEDURE — 99395 PREV VISIT EST AGE 18-39: CPT | Mod: 25 | Performed by: FAMILY MEDICINE

## 2024-08-16 PROCEDURE — 99214 OFFICE O/P EST MOD 30 MIN: CPT | Mod: 25 | Performed by: FAMILY MEDICINE

## 2024-08-16 PROCEDURE — 90715 TDAP VACCINE 7 YRS/> IM: CPT | Performed by: FAMILY MEDICINE

## 2024-08-16 PROCEDURE — 96127 BRIEF EMOTIONAL/BEHAV ASSMT: CPT | Performed by: FAMILY MEDICINE

## 2024-08-16 RX ORDER — CITALOPRAM HYDROBROMIDE 10 MG/1
10 TABLET ORAL DAILY
Qty: 90 TABLET | Refills: 3 | Status: SHIPPED | OUTPATIENT
Start: 2024-08-16

## 2024-08-16 RX ORDER — ALBUTEROL SULFATE 0.83 MG/ML
SOLUTION RESPIRATORY (INHALATION)
Qty: 27 ML | Refills: 3 | Status: SHIPPED | OUTPATIENT
Start: 2024-08-16

## 2024-08-16 RX ORDER — FLUTICASONE PROPIONATE 50 MCG
1 SPRAY, SUSPENSION (ML) NASAL DAILY
Qty: 18 G | Refills: 11 | Status: SHIPPED | OUTPATIENT
Start: 2024-08-16

## 2024-08-16 RX ORDER — ALBUTEROL SULFATE 90 UG/1
1-2 AEROSOL, METERED RESPIRATORY (INHALATION) EVERY 6 HOURS PRN
Qty: 8.5 G | Refills: 11 | Status: SHIPPED | OUTPATIENT
Start: 2024-08-16

## 2024-08-16 RX ORDER — FLUTICASONE PROPIONATE AND SALMETEROL 250; 50 UG/1; UG/1
1 POWDER RESPIRATORY (INHALATION) EVERY 12 HOURS
Qty: 3 EACH | Refills: 3 | Status: SHIPPED | OUTPATIENT
Start: 2024-08-16

## 2024-08-16 SDOH — HEALTH STABILITY: PHYSICAL HEALTH: ON AVERAGE, HOW MANY DAYS PER WEEK DO YOU ENGAGE IN MODERATE TO STRENUOUS EXERCISE (LIKE A BRISK WALK)?: 5 DAYS

## 2024-08-16 ASSESSMENT — ANXIETY QUESTIONNAIRES
7. FEELING AFRAID AS IF SOMETHING AWFUL MIGHT HAPPEN: MORE THAN HALF THE DAYS
IF YOU CHECKED OFF ANY PROBLEMS ON THIS QUESTIONNAIRE, HOW DIFFICULT HAVE THESE PROBLEMS MADE IT FOR YOU TO DO YOUR WORK, TAKE CARE OF THINGS AT HOME, OR GET ALONG WITH OTHER PEOPLE: SOMEWHAT DIFFICULT
2. NOT BEING ABLE TO STOP OR CONTROL WORRYING: NEARLY EVERY DAY
5. BEING SO RESTLESS THAT IT IS HARD TO SIT STILL: MORE THAN HALF THE DAYS
6. BECOMING EASILY ANNOYED OR IRRITABLE: SEVERAL DAYS
1. FEELING NERVOUS, ANXIOUS, OR ON EDGE: MORE THAN HALF THE DAYS
GAD7 TOTAL SCORE: 15
3. WORRYING TOO MUCH ABOUT DIFFERENT THINGS: NEARLY EVERY DAY
GAD7 TOTAL SCORE: 15

## 2024-08-16 ASSESSMENT — ASTHMA QUESTIONNAIRES
QUESTION_2 LAST FOUR WEEKS HOW OFTEN HAVE YOU HAD SHORTNESS OF BREATH: ONCE OR TWICE A WEEK
QUESTION_4 LAST FOUR WEEKS HOW OFTEN HAVE YOU USED YOUR RESCUE INHALER OR NEBULIZER MEDICATION (SUCH AS ALBUTEROL): ONCE A WEEK OR LESS
ACT_TOTALSCORE: 20
ACT_TOTALSCORE: 20
QUESTION_1 LAST FOUR WEEKS HOW MUCH OF THE TIME DID YOUR ASTHMA KEEP YOU FROM GETTING AS MUCH DONE AT WORK, SCHOOL OR AT HOME: A LITTLE OF THE TIME
QUESTION_3 LAST FOUR WEEKS HOW OFTEN DID YOUR ASTHMA SYMPTOMS (WHEEZING, COUGHING, SHORTNESS OF BREATH, CHEST TIGHTNESS OR PAIN) WAKE YOU UP AT NIGHT OR EARLIER THAN USUAL IN THE MORNING: ONCE OR TWICE
QUESTION_5 LAST FOUR WEEKS HOW WOULD YOU RATE YOUR ASTHMA CONTROL: WELL CONTROLLED

## 2024-08-16 ASSESSMENT — PAIN SCALES - GENERAL: PAINLEVEL: SEVERE PAIN (6)

## 2024-08-16 ASSESSMENT — PATIENT HEALTH QUESTIONNAIRE - PHQ9
5. POOR APPETITE OR OVEREATING: MORE THAN HALF THE DAYS
10. IF YOU CHECKED OFF ANY PROBLEMS, HOW DIFFICULT HAVE THESE PROBLEMS MADE IT FOR YOU TO DO YOUR WORK, TAKE CARE OF THINGS AT HOME, OR GET ALONG WITH OTHER PEOPLE: VERY DIFFICULT
SUM OF ALL RESPONSES TO PHQ QUESTIONS 1-9: 12
SUM OF ALL RESPONSES TO PHQ QUESTIONS 1-9: 12

## 2024-08-16 NOTE — PROGRESS NOTES
Preventive Care Visit  [facility]  Joe Polanco MD, Family Medicine  Aug 16, 2024        Rafael was seen today for physical, blood draw, asthma, depression and imm/inj.    Diagnoses and all orders for this visit:    Encounter for routine adult health examination without abnormal findings        -   Patient is here for his physical. He is doing well overall.    Moderate persistent asthma without complication  -     albuterol (PROAIR HFA/PROVENTIL HFA/VENTOLIN HFA) 108 (90 Base) MCG/ACT inhaler; Inhale 1-2 puffs into the lungs every 6 hours as needed for shortness of breath, wheezing or cough  -     fluticasone (FLONASE) 50 MCG/ACT nasal spray; Spray 1 spray into both nostrils daily  -     fluticasone-salmeterol (ADVAIR) 250-50 MCG/ACT inhaler; Inhale 1 puff into the lungs every 12 hours  -     OFFICE/OUTPT VISIT,EST,LEVL IV  -     Patient reports that his asthma is under control.  -     -     albuterol (PROVENTIL) (2.5 MG/3ML) 0.083% neb solution; NEBULIZE CONTENTS OF ONE VIAL EVERY 4 HOURS AS NEEDED FOR SHORTNESS OF BREATH /DYSPNEA OR WHEEZING Strength: (2.5 MG/3ML) 0.083%    Depression, unspecified depression type  -     citalopram (CELEXA) 10 MG tablet; Take 1 tablet (10 mg) by mouth daily  -     OFFICE/OUTPT VISIT,EST,LEVL IV    Class 3 severe obesity due to excess calories with serious comorbidity and body mass index (BMI) of 60.0 to 69.9 in adult (H)  -     Semaglutide-Weight Management (WEGOVY) 0.25 MG/0.5ML pen; Inject 0.25 mg subcutaneously once a week  -     OFFICE/OUTPT VISIT,EST,LEVL IV    Other orders  -     TDAP 10-64Y (ADACEL,BOOSTRIX)           Subjective   Rafael is a 34 year old, presenting for the following:    Rafael is a 34-year-old male here for his annual physical.  He has a past medical history significant for depression and anxiety.  He also has asthma.  Since his last visit he has not had any major health changes.  He reports his asthma is  well-controlled.  We talked about his weight and he is open to trying medication to help him lose weight.  He said he has never been really motivated to lose weight.  He is morbidly obese.  Medication was faxed to Pharmacy.  Will follow-up in about a month.  Physical (General physical exam.), Blood Draw (Patient is not fasting today.), Asthma (Recheck on asthma.), Depression (Recheck on depression. ), and Imm/Inj (He is due to update his Tetanus injection.)        8/16/2024    11:46 AM   Additional Questions   Roomed by Suzan Call [facility]        Health Care Directive  Patient does not have a Health Care Directive or Living Will: Discussed advance care planning with patient; information given to patient to review.    HPI  Chief Complaint   Patient presents with    Physical     General physical exam.    Blood Draw     Patient is not fasting today.    Asthma     Recheck on asthma.    Depression     Recheck on depression.     Imm/Inj     He is due to update his Tetanus injection.         Depression   How are you doing with your depression since your last visit? No change, about the same.  Are you having other symptoms that might be associated with depression? Yes:  Anxiety.  Have you had a significant life event?  No   Are you feeling anxious or having panic attacks?   Yes:  can have symptoms of anxiety from time to time.  Do you have any concerns with your use of alcohol or other drugs? No    Social History     Tobacco Use    Smoking status: Former     Current packs/day: 0.00     Types: Cigarettes    Smokeless tobacco: Never    Tobacco comments:     5-6 ciggarettes per day as of 7/16/2014 . Stopped smoking 1 year ago.11/2/2019   Vaping Use    Vaping status: Never Used   Substance Use Topics    Alcohol use: Yes     Comment: Rare- once a month     Drug use: No         8/25/2023     5:26 AM 8/16/2024    11:32 AM   PHQ   PHQ-9 Total Score 17 12   Q9: Thoughts of better off dead/self-harm past 2 weeks Not at all Not at all          8/25/2023     5:26 AM 8/16/2024    12:13 PM   KATHLEEN-7 SCORE   Total Score 13 (moderate anxiety)    Total Score 13 15         8/16/2024    11:32 AM   Last PHQ-9   1.  Little interest or pleasure in doing things 2   2.  Feeling down, depressed, or hopeless 1   3.  Trouble falling or staying asleep, or sleeping too much 1   4.  Feeling tired or having little energy 1   5.  Poor appetite or overeating 2   6.  Feeling bad about yourself 1   7.  Trouble concentrating 3   8.  Moving slowly or restless 1   Q9: Thoughts of better off dead/self-harm past 2 weeks 0   PHQ-9 Total Score 12         8/16/2024    12:13 PM   KATHLEEN-7    1. Feeling nervous, anxious, or on edge 2   2. Not being able to stop or control worrying 3   3. Worrying too much about different things 3   4. Trouble relaxing 2   5. Being so restless that it is hard to sit still 2   6. Becoming easily annoyed or irritable 1   7. Feeling afraid, as if something awful might happen 2   KATHLEEN-7 Total Score 15   If you checked any problems, how difficult have they made it for you to do your work, take care of things at home, or get along with other people? Somewhat difficult       Suicide Assessment Five-step Evaluation and Treatment (SAFE-T)    Asthma Follow-Up    Was ACT completed today?  Yes        8/16/2024    11:37 AM   ACT Total Scores   ACT TOTAL SCORE (Goal Greater than or Equal to 20) 20   In the past 12 months, how many times did you visit the emergency room for your asthma without being admitted to the hospital? 0   In the past 12 months, how many times were you hospitalized overnight because of your asthma? 0        How many days per week do you miss taking your asthma controller medication?  2  Please describe any recent triggers for your asthma: None  Have you had any Emergency Room Visits, Urgent Care Visits, or Hospital Admissions since your last office visit?  No            8/16/2024   General Health   How would you rate your overall physical health?  (!) FAIR   Feel stress (tense, anxious, or unable to sleep) Rather much      (!) STRESS CONCERN      8/16/2024   Nutrition   Three or more servings of calcium each day? Yes   Diet: Regular (no restrictions)   How many servings of fruit and vegetables per day? (!) 0-1   How many sweetened beverages each day? 0-1            8/16/2024   Exercise   Days per week of moderate/strenous exercise 5 days            8/16/2024   Social Factors   Worry food won't last until get money to buy more No   Food not last or not have enough money for food? No   Do you have housing? (Housing is defined as stable permanent housing and does not include staying ouside in a car, in a tent, in an abandoned building, in an overnight shelter, or couch-surfing.) Yes   Are you worried about losing your housing? No   Lack of transportation? No   Unable to get utilities (heat,electricity)? No            8/16/2024   Dental   Dentist two times every year? (!) NO            8/16/2024   TB Screening   Were you born outside of the [place]? No          Today's PHQ-9 Score:       8/16/2024    11:32 AM   PHQ-9 SCORE   PHQ-9 Total Score MyChart 12 (Moderate depression)   PHQ-9 Total Score 12         8/16/2024   Substance Use   Alcohol more than 3/day or more than 7/wk Not Applicable   Do you use any other substances recreationally? (!) CANNABIS PRODUCTS        Social History     Tobacco Use    Smoking status: Former     Current packs/day: 0.00     Types: Cigarettes    Smokeless tobacco: Never    Tobacco comments:     5-6 ciggarettes per day as of 7/16/2014 . Stopped smoking 1 year ago.11/2/2019   Vaping Use    Vaping status: Never Used   Substance Use Topics    Alcohol use: Yes     Comment: Rare- once a month     Drug use: No             8/16/2024   One time HIV Screening   Previous HIV test? No          8/16/2024   STI Screening   New sexual partner(s) since last STI/HIV test? No            8/16/2024   Contraception/Family Planning   Questions about  contraception or family planning No          Reviewed and updated as needed this visit by Provider     Meds  Problems               Past Medical History:   Diagnosis Date    Asthma      Past Surgical History:   Procedure Laterality Date    NONE       Lab work is in process  Labs reviewed in EPIC  BP Readings from Last 3 Encounters:   08/16/24 137/73   10/24/23 (!) 146/89   09/20/23 138/68    Wt Readings from Last 3 Encounters:   08/16/24 (!) 189.1 kg (417 lb)   10/24/23 (!) 190.5 kg (420 lb)   09/20/23 (!) 185.5 kg (409 lb)                  Patient Active Problem List   Diagnosis    Obesity    CARDIOVASCULAR SCREENING; LDL GOAL LESS THAN 130    Intermittent asthma    Nicotine abuse    Bilateral low back pain without sciatica    Morbid obesity (H)     Past Surgical History:   Procedure Laterality Date    NONE         Social History     Tobacco Use    Smoking status: Former     Current packs/day: 0.00     Types: Cigarettes    Smokeless tobacco: Never    Tobacco comments:     5-6 ciggarettes per day as of 7/16/2014 . Stopped smoking 1 year ago.11/2/2019   Substance Use Topics    Alcohol use: Yes     Comment: Rare- once a month      Family History   Problem Relation Age of Onset    Hypertension Paternal Grandfather     Asthma Paternal Grandfather     Asthma Father     Asthma Paternal Grandmother     Respiratory Paternal Grandmother         SHARLENE    Asthma Paternal Uncle     Asthma Paternal Aunt     Diabetes No family hx of     Cancer - colorectal No family hx of     C.A.D. No family hx of     Cerebrovascular Disease No family hx of          Current Outpatient Medications   Medication Sig Dispense Refill    albuterol (PROAIR HFA/PROVENTIL HFA/VENTOLIN HFA) 108 (90 Base) MCG/ACT inhaler Inhale 1-2 puffs into the lungs every 6 hours as needed for shortness of breath, wheezing or cough 8.5 g 11    albuterol (PROVENTIL) (2.5 MG/3ML) 0.083% neb solution NEBULIZE CONTENTS OF ONE VIAL EVERY 4 HOURS AS NEEDED FOR SHORTNESS OF  "BREATH /DYSPNEA OR WHEEZING Strength: (2.5 MG/3ML) 0.083% 27 mL 3    citalopram (CELEXA) 10 MG tablet Take 1 tablet (10 mg) by mouth daily 90 tablet 3    fluticasone (FLONASE) 50 MCG/ACT nasal spray Spray 1 spray into both nostrils daily 18 g 11    fluticasone-salmeterol (ADVAIR) 250-50 MCG/ACT inhaler Inhale 1 puff into the lungs every 12 hours 3 each 3    Semaglutide-Weight Management (WEGOVY) 0.25 MG/0.5ML pen Inject 0.25 mg subcutaneously once a week 2 mL 0    triamcinolone (NASACORT) 55 MCG/ACT nasal aerosol Spray 2 sprays into both nostrils daily       No Known Allergies      Review of Systems  Constitutional, HEENT, cardiovascular, pulmonary, gi and gu systems are negative, except as otherwise noted.     Objective    Exam  /73 (BP Location: Right arm, Patient Position: Chair, Cuff Size: Adult Large)   Pulse 74   Temp 98.7  F (37.1  C) (Tympanic)   Resp 16   Ht 1.74 m (5' 8.5\")   Wt (!) 189.1 kg (417 lb)   SpO2 96%   BMI 62.48 kg/m     Estimated body mass index is 62.48 kg/m  as calculated from the following:    Height as of this encounter: 1.74 m (5' 8.5\").    Weight as of this encounter: 189.1 kg (417 lb).    Physical Exam  GENERAL: alert and no distress  EYES: Eyes grossly normal to inspection, PERRL and conjunctivae and sclerae normal  HENT: ear canals and TM's normal, nose and mouth without ulcers or lesions  NECK: no adenopathy, no asymmetry, masses, or scars  RESP: lungs clear to auscultation - no rales, rhonchi or wheezes  CV: regular rate and rhythm, normal S1 S2, no S3 or S4, no murmur, click or rub, no peripheral edema  ABDOMEN: soft, nontender, no hepatosplenomegaly, no masses and bowel sounds normal  MS: no gross musculoskeletal defects noted, no edema  SKIN: no suspicious lesions or rashes  NEURO: Normal strength and tone, mentation intact and speech normal  PSYCH: mentation appears normal, affect normal/bright        Signed Electronically by: Joe Polanco MD    Answers " submitted by the patient for this visit:  Patient Health Questionnaire (Submitted on 8/16/2024)  If you checked off any problems, how difficult have these problems made it for you to do your work, take care of things at home, or get along with other people?: Very difficult  PHQ9 TOTAL SCORE: 12

## 2024-08-16 NOTE — NURSING NOTE
Prior to immunization administration, verified patients identity using patient s name and date of birth. Please see Immunization Activity for additional information.     Screening Questionnaire for Adult Immunization    Are you sick today?   No   Do you have allergies to medications, food, a vaccine component or latex?   Don't Know   Have you ever had a serious reaction after receiving a vaccination?   No   Do you have a long-term health problem with heart, lung, kidney, or metabolic disease (e.g., diabetes), asthma, a blood disorder, no spleen, complement component deficiency, a cochlear implant, or a spinal fluid leak?  Are you on long-term aspirin therapy?   Yes   Do you have cancer, leukemia, HIV/AIDS, or any other immune system problem?   No   Do you have a parent, brother, or sister with an immune system problem?   No   In the past 3 months, have you taken medications that affect  your immune system, such as prednisone, other steroids, or anticancer drugs; drugs for the treatment of rheumatoid arthritis, Crohn s disease, or psoriasis; or have you had radiation treatments?   No   Have you had a seizure, or a brain or other nervous system problem?   No   During the past year, have you received a transfusion of blood or blood    products, or been given immune (gamma) globulin or antiviral drug?   No   For women: Are you pregnant or is there a chance you could become       pregnant during the next month?   No   Have you received any vaccinations in the past 4 weeks?   No     Immunization questionnaire was positive for at least one answer.  When reading the back of the injection sheet it is fine to give this injection today.      Patient instructed to remain in clinic for 15 minutes afterwards, and to report any adverse reactions.     Screening performed by Suzan Call CMA on 8/16/2024 at 12:09 PM.